# Patient Record
Sex: MALE | Race: WHITE | NOT HISPANIC OR LATINO | Employment: FULL TIME | URBAN - METROPOLITAN AREA
[De-identification: names, ages, dates, MRNs, and addresses within clinical notes are randomized per-mention and may not be internally consistent; named-entity substitution may affect disease eponyms.]

---

## 2017-03-08 ENCOUNTER — THERAPY VISIT (OUTPATIENT)
Dept: PHYSICAL THERAPY | Facility: CLINIC | Age: 47
End: 2017-03-08
Payer: COMMERCIAL

## 2017-03-08 DIAGNOSIS — M89.8X1 PAIN OF RIGHT SCAPULA: Primary | ICD-10-CM

## 2017-03-08 PROCEDURE — 97161 PT EVAL LOW COMPLEX 20 MIN: CPT | Mod: GP | Performed by: PHYSICAL THERAPIST

## 2017-03-08 PROCEDURE — 97140 MANUAL THERAPY 1/> REGIONS: CPT | Mod: GP | Performed by: PHYSICAL THERAPIST

## 2017-03-08 PROCEDURE — 97110 THERAPEUTIC EXERCISES: CPT | Mod: GP | Performed by: PHYSICAL THERAPIST

## 2017-03-08 NOTE — PROGRESS NOTES
[facility] Initial Evaluation    Subjective:    Jovon Loya is a 46 year old male with a right shoulder condition.  Condition occurred with:  Repetition/overuse.  Condition occurred: during recreation/sport.  This is a new condition  Patient reports an onset of right scapular pain on 1-17-17 after playing tennis.  He played tennis twice later in January with increased pain and last on 1-31-17.  He has used ice and Aleve for 2 weeks and rested and with some improvement. He has then used heat and had not had much improvement since then. Chief complaints are of intermittent right scapular pain. Pain is worse with driving with right arm elevated and lifting. He is not playing tennis. He denies weakness or stiffness. He denies neck pain or tingling or numbness. Patient is right handed. .    Patient reports pain:  Scapular area.     and is intermittent and reported as 2/10.  Associated symptoms:  Loss of strength. Pain is the same all the time.  Symptoms are exacerbated by lifting, using arm overhead and certain positions and relieved by ice and rest.  Since onset symptoms are gradually improving.  Special tests:  X-ray (shoulder and neck negative).      General health as reported by patient is good.  Pertinent medical history includes:  None.  Medical allergies: yes (morphine).  Other surgeries include:  Orthopedic surgery (lumbar 1997 and 1998).  Current medications:  None as reported by the patient.  Current occupation is Investigator.  Patient is working in normal job without restrictions.  Primary job tasks include:  Prolonged sitting and driving.                              Objective:    Standing Alignment:    Cervical/Thoracic:  Forward head  Shoulder/UE:  Rounded shoulders, protracted scapula L and protracted scapula R (large lipoma right T10 area)                                  Cervical/Thoracic Evaluation  Cervical AROM: normal (painfree except right SB mild right scap pain,  painfree with right closed paick positiion.)                                  Shoulder Evaluation:  ROM:  AROM:  normal                                  Strength:    Flexion: Left:5/5   Pain:    Right: 4+/5      Pain:  -  Extension:  Left: 4+/5    Pain:    Right: 4/5    Pain:  Abduction:  Left: 5/5  Pain:    Right: 5/5     Pain:    Internal Rotation:  Left:5/5     Pain:    Right: 5/5     Pain:  External Rotation:   Left:5/5     Pain:   Right:5/5     Pain:    Horizontal Abduction:  Left:4/5     Pain:    Right:4-/5    Pain:        Stability Testing:  normal      Special Tests:        Right shoulder negative for the following special tests:Impingement; Bursal and Rotator cuff tear  Palpation:      Right shoulder tenderness present at: Rhomboids (moderate hypertonicity and tenderness)                                     General     ROS    Assessment/Plan:      Patient is a 46 year old male with right side shoulder complaints.    Patient has the following significant findings with corresponding treatment plan.                Diagnosis 1:  Right scapular pain    Pain -  manual therapy, education and home program  Decreased ROM/flexibility - manual therapy and therapeutic exercise  Decreased strength - therapeutic exercise and therapeutic activities  Impaired muscle performance - neuro re-education  Decreased function - therapeutic activities  Impaired posture - neuro re-education    Therapy Evaluation Codes:   1) History comprised of:   Personal factors that impact the plan of care:      None.    Comorbidity factors that impact the plan of care are:      None.     Medications impacting care: None.  2) Examination of Body Systems comprised of:   Body structures and functions that impact the plan of care:      Cervical spine and Shoulder.   Activity limitations that impact the plan of care are:      Driving, Lifting and Sports.  3) Clinical presentation characteristics are:   Stable/Uncomplicated.  4) Decision-Making    Low  complexity using standardized patient assessment instrument and/or measureable assessment of functional outcome.  Cumulative Therapy Evaluation is: Low complexity.    Previous and current functional limitations:  (See Goal Flow Sheet for this information)    Short term and Long term goals: (See Goal Flow Sheet for this information)     Communication ability:  Patient appears to be able to clearly communicate and understand verbal and written communication and follow directions correctly.  Treatment Explanation - The following has been discussed with the patient:   RX ordered/plan of care  Anticipated outcomes  Possible risks and side effects  This patient would benefit from PT intervention to resume normal activities.   Rehab potential is good.    Frequency:  1 X week, once daily  Duration:  for 8 weeks  Discharge Plan:  Achieve all LTG.  Independent in home treatment program.  Reach maximal therapeutic benefit.    Please refer to the daily flowsheet for treatment today, total treatment time and time spent performing 1:1 timed codes.

## 2017-03-08 NOTE — LETTER
[facility] PHYSICAL THERAPY  [address]  [facility] [address]  [phone]    2017    Re: Jovon Loya   :   1970  MRN:  [id number]   REFERRING PHYSICIAN:   Arpit Boateng    [facility] PHYSICAL THERAPY  Date of Initial Evaluation:  3/8/17  Visits:  Rxs Used: 1  Reason for Referral:  Pain of right scapula    EVALUATION SUMMARY    [facility] Initial Evaluation    Subjective:    Jovon Loya is a 46 year old male with a right shoulder condition.  Condition occurred with:  Repetition/overuse.  Condition occurred: during recreation/sport.  This is a new condition  Patient reports an onset of right scapular pain on 17 after playing tennis.  He played tennis twice later in January with increased pain and last on 17.  He has used ice and Aleve for 2 weeks and rested and with some improvement. He has then used heat and had not had much improvement since then. Chief complaints are of intermittent right scapular pain. Pain is worse with driving with right arm elevated and lifting. He is not playing tennis. He denies weakness or stiffness. He denies neck pain or tingling or numbness. Patient is right handed. .    Patient reports pain:  Scapular area.     and is intermittent and reported as 2/10.  Associated symptoms:  Loss of strength. Pain is the same all the time.  Symptoms are exacerbated by lifting, using arm overhead and certain positions and relieved by ice and rest.  Since onset symptoms are gradually improving.  Special tests:  X-ray (shoulder and neck negative).      General health as reported by patient is good.  Pertinent medical history includes:  None.  Medical allergies: yes (morphine).  Other surgeries include:  Orthopedic surgery (lumbar  and ).  Current medications:  None as reported by the patient.  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  Primary job tasks include:  Prolonged sitting and driving.               Objective:    Standing Alignment:    Cervical/Thoracic:  Forward head  Shoulder/UE:  Rounded shoulders, protracted scapula L and protracted scapula R (large lipoma right T10 area)       Cervical/Thoracic Evaluation  Cervical AROM: normal (painfree except right SB mild right scap pain, painfree with right closed paick positiion.)           Re: Jovon Loya   :   1970          Shoulder Evaluation:  ROM:  AROM:  normal  Strength:    Flexion: Left:5/5   Pain:    Right: 4+/5      Pain:  -  Extension:  Left: 4+/5    Pain:    Right: 4/5    Pain:  Abduction:  Left: 5/5  Pain:    Right: 5/5     Pain:  Internal Rotation:  Left:5/5     Pain:    Right: 5/5     Pain:  External Rotation:   Left:5/5     Pain:   Right:5/5     Pain:    Horizontal Abduction:  Left:4/5     Pain:    Right:4-/5    Pain:  Stability Testing:  normal  Special Tests:    Right shoulder negative for the following special tests:Impingement; Bursal and Rotator cuff tear  Palpation:    Right shoulder tenderness present at: Rhomboids (moderate hypertonicity and tenderness)    Assessment/Plan:      Patient is a 46 year old male with right side shoulder complaints.    Patient has the following significant findings with corresponding treatment plan.                Diagnosis 1:  Right scapular pain    Pain -  manual therapy, education and home program  Decreased ROM/flexibility - manual therapy and therapeutic exercise  Decreased strength - therapeutic exercise and therapeutic activities  Impaired muscle performance - neuro re-education  Decreased function - therapeutic activities  Impaired posture - neuro re-education    Therapy Evaluation Codes:   1) History comprised of:   Personal factors that impact the plan of care:      None.    Comorbidity factors that impact the plan of care are:      None.     Medications impacting care:  None.  2) Examination of Body Systems comprised of:   Body structures and functions that impact the plan of care:      Cervical spine and Shoulder.   Activity limitations that impact the plan of care are:      Driving, Lifting and Sports.  3) Clinical presentation characteristics are:   Stable/Uncomplicated.  4) Decision-Making    Low complexity using standardized patient assessment instrument and/or measureable assessment of functional outcome.  Cumulative Therapy Evaluation is: Low complexity.    Previous and current functional limitations:  (See Goal Flow Sheet for this information)    Short term and Long term goals: (See Goal Flow Sheet for this information)   Re: Jovon Loya   :   1970          Communication ability:  Patient appears to be able to clearly communicate and understand verbal and written communication and follow directions correctly.  Treatment Explanation - The following has been discussed with the patient:   RX ordered/plan of care  Anticipated outcomes  Possible risks and side effects  This patient would benefit from PT intervention to resume normal activities.   Rehab potential is good.    Frequency:  1 X week, once daily  Duration:  for 8 weeks  Discharge Plan:  Achieve all LTG.  Independent in home treatment program.  Reach maximal therapeutic benefit.    Thank you for your referral.    INQUIRIES  Therapist: Delma Whittington, PT  [facility]  [address] [facility] [address]  [facility] [address]  Phone: [phone]  Fax: [phone]

## 2017-03-08 NOTE — MR AVS SNAPSHOT
After Visit Summary   3/8/2017    Jovon Loya    MRN: [id number]           Patient Information     Date Of Birth          1970        Visit Information        Provider Department      3/8/2017 10:10 AM Delma Whittington PT [facility] Physical Therapy        Today's Diagnoses     Pain of right scapula    -  1       Follow-ups after your visit        Your next 10 appointments already scheduled     Mar 16, 2017  8:50 AM CDT   ABY Extremity with Delma Whittington PT   [facility] Physical Therapy ([facility])    [address]  [facility] [address]   [phone]            Mar 23, 2017 10:10 AM CDT   ABY Extremity with Delma Whittington PT   [facility] Physical Therapy ([facility])    [address]  [facility] [address]   [phone]            Mar 30, 2017 10:10 AM CDT   ABY Extremity with Delma Whittington PT   [facility] Physical Therapy ([facility])    [address]  [facility] [address]   [phone]            Apr 05, 2017 10:10 AM CDT   ABY Extremity with Delma Whittington PT   [facility] Physical Therapy ([facility])    [address]  [facility] [address]   [phone]            Apr 13, 2017 10:10 AM CDT   ABY Extremity with Delma Whittington PT   [facility] Therapy ([facility])    [address]  [place] [address]   [phone]            Apr 20, 2017 10:10 AM CDT   ABY Extremity with Delma Whittington PT   [facility] Therapy ([facility])    [address]  [place] [address]   [phone]            Apr 27, 2017 10:10 AM CDT   ABY Extremity with Delma Whittington PT   [facility]  Physical Therapy ([facility])    [address] [address]  [facility] [address] [phone]            May 04, 2017 10:10 AM CDT   [facility] Extremity with Delma Whittington PT   [facility] Physical Therapy ([facility])    [address]  [facility] [id number] [phone]              Who to contact     If you have questions or need follow up information about today's clinic visit or your schedule please contact [facility] A[facility]LETIC [facility] PHYSICAL THERAPY directly at [phone].  Normal or non-critical lab and imaging results will be communicated to you by Liquid Gridshart, letter or phone within 4 business days after the clinic has received the results. If you do not hear from us within 7 days, please contact the clinic through Liquid Gridshart or phone. If you have a critical or abnormal lab result, we will notify you by phone as soon as possible.  Submit refill requests through Liquid Spins or call your pharmacy and they will forward the refill request to us. Please allow 3 business days for your refill to be completed.          Additional Information About Your Visit        Liquid Gridshart Information     Liquid Spins gives you secure access to your electronic health record. If you see a primary care provider, you can also send messages to your care team and make appointments. If you have questions, please call your primary care clinic.  If you do not have a primary care provider, please call [phone] and they will assist you.        Care EveryWhere ID     This is your Care EveryWhere ID. This could be used by other organizations to access your [place] medical records  [id number]         Blood Pressure from Last 3 Encounters:   09/13/16 122/88   06/27/13 150/90   10/13/11 120/70    Weight from Last 3 Encounters:   09/13/16 118.6 kg (261 lb 6.4 oz)   06/27/13 123.5 kg (272 lb 3.2 oz)   10/13/11 121.7 kg (268 lb 3.2 oz)              We Performed the Following      HC PT EVAL, LOW COMPLEXITY     ABY INITIAL EVAL REPORT     MANUAL THER TECH,1+REGIONS,EA 15 MIN     THERAPEUTIC EXERCISES        Primary Care Provider Office Phone # Fax #    Isak Granados [phone]       [facility] PHYSICIANS [address] [facility] [address]  [facility] [address]        Thank you!     Thank you for choosing [facility] [facility] PHYSICAL THERAPY  for your care. Our goal is always to provide you with excellent care. Hearing back from our patients is one way we can continue to improve our services. Please take a few minutes to complete the written survey that you may receive in the mail after your visit with us. Thank you!             Your Updated Medication List - Protect others around you: Learn how to safely use, store and throw away your medicines at www.disposemymeds.org.          This list is accurate as of: 3/8/17 11:14 AM.  Always use your most recent med list.                   Brand Name Dispense Instructions for use    ALEVE PO      Take 220 mg by mouth

## 2017-03-16 ENCOUNTER — THERAPY VISIT (OUTPATIENT)
Dept: PHYSICAL THERAPY | Facility: CLINIC | Age: 47
End: 2017-03-16
Payer: COMMERCIAL

## 2017-03-16 DIAGNOSIS — M89.8X1 PAIN OF RIGHT SCAPULA: ICD-10-CM

## 2017-03-16 PROCEDURE — 97110 THERAPEUTIC EXERCISES: CPT | Mod: GP | Performed by: PHYSICAL THERAPIST

## 2017-03-16 PROCEDURE — 97140 MANUAL THERAPY 1/> REGIONS: CPT | Mod: GP | Performed by: PHYSICAL THERAPIST

## 2017-03-16 NOTE — MR AVS SNAPSHOT
After Visit Summary   3/16/2017    Jovon Loya    MRN: [id number]           Patient Information     Date Of Birth          1970        Visit Information        Provider Department      3/16/2017 8:50 AM Delma Whittington PT [facility] Physical Therapy        Today's Diagnoses     Pain of right scapula           Follow-ups after your visit        Your next 10 appointments already scheduled     Mar 23, 2017 10:10 AM CDT   ABY Extremity with Delma Whittington PT   [facility] Physical Therapy ([facility])    [address]  [facility] [address]   [phone]            Mar 30, 2017 10:10 AM CDT   ABY Extremity with Delma Whittington PT   [facility] Physical Therapy ([facility])    [address]  [facility] [address]   [phone]            Apr 05, 2017 10:10 AM CDT   ABY Extremity with Delma Whittington PT   [place] [facility] Physical Therapy ([facility])    [address]  [facility] [address]   [phone]            Apr 13, 2017 10:10 AM CDT   ABY Extremity with Delma Whittington PT   [place] [facility] Physical Therapy ([facility])    [address]  [facility] [address]   [phone]            Apr 20, 2017 10:10 AM CDT   ABY Extremity with Delma Whittington PT   [place] [facility] Physical Therapy ([facility])    [address]  [place] [address]   [phone]            Apr 27, 2017 10:10 AM CDT   ABY Extremity with Delma Whittington PT   [place] [facility] Physical Therapy ([facility])    [address]  [facility] [address]   [phone]            May 04, 2017 10:10 AM CDT   ABY Extremity with Delma Whittington PT   [place] [facility]  Therapy ([facility]    [address]  [facility] [id number] [phone]              Who to contact     If you have questions or need follow up information about today's clinic visit or your schedule please contact [facility] directly at [phone].  Normal or non-critical lab and imaging results will be communicated to you by MyChart, letter or phone within 4 business days after the clinic has received the results. If you do not hear from us within 7 days, please contact the clinic through MyChart or phone. If you have a critical or abnormal lab result, we will notify you by phone as soon as possible.  Submit refill requests through Grability or call your pharmacy and they will forward the refill request to us. Please allow 3 business days for your refill to be completed.          Additional Information About Your Visit        Solais Lightinghart Information     Grability gives you secure access to your electronic health record. If you see a primary care provider, you can also send messages to your care team and make appointments. If you have questions, please call your primary care clinic.  If you do not have a primary care provider, please call [phone] and they will assist you.        Care EveryWhere ID     This is your Care EveryWhere ID. This could be used by other organizations to access your [place] medical records  [id number]         Blood Pressure from Last 3 Encounters:   09/13/16 122/88   06/27/13 150/90   10/13/11 120/70    Weight from Last 3 Encounters:   09/13/16 118.6 kg (261 lb 6.4 oz)   06/27/13 123.5 kg (272 lb 3.2 oz)   10/13/11 121.7 kg (268 lb 3.2 oz)              We Performed the Following     MANUAL THER TECH,1+REGIONS,EA 15 MIN     THERAPEUTIC EXERCISES        Primary Care Provider Office Phone # Fax #    Isak Granados [phone]       [facility] PHYSICIANS [address]  [facility] [address]        Thank you!     Thank you [facility] choosing [facility]  for your care. Our goal is always to provide you with excellent care. Hearing back from our patients is one way we can continue to improve our services. Please take a few minutes to complete the written survey that you may receive in the mail after your visit with us. Thank you!             Your Updated Medication List - Protect others around you: Learn how to safely use, store and throw away your medicines at www.disposemymeds.org.          This list is accurate as of: 3/16/17  9:25 AM.  Always use your most recent med list.                   Brand Name Dispense Instructions for use    ALEVE PO      Take 220 mg by mouth

## 2017-03-16 NOTE — PROGRESS NOTES
Subjective:    HPI                    Objective:    System    Physical Exam    General     ROS    Assessment/Plan:      SUBJECTIVE  Subjective changes as noted by pt:   R shoulder blade is feeling better and less of a muscle knot.    Current pain level:  0/10   Changes in function:  None     Adverse reaction to treatment or activity:  None    OBJECTIVE  Changes in objective findings:  Yes,  Trace right rhomboid hypertonicity. Posture poor to fair with effort. Poor to fair scap control. Prone shoulder extension grade 4+/5.      ASSESSMENT  Jovon continues to require intervention to meet STG and LTG's: PT  Patient is progressing as expected.  Response to therapy has shown an improvement in  pain level, flexibility, strength and muscle control  Progress made towards STG/LTG?  None    PLAN  Current treatment program is being advanced to more complex exercises.    PTA/ATC plan:  N/A    Please refer to the daily flowsheet for treatment today, total treatment time and time spent performing 1:1 timed codes.

## 2017-03-23 ENCOUNTER — THERAPY VISIT (OUTPATIENT)
Dept: PHYSICAL THERAPY | Facility: CLINIC | Age: 47
End: 2017-03-23
Payer: COMMERCIAL

## 2017-03-23 DIAGNOSIS — M89.8X1 PAIN OF RIGHT SCAPULA: ICD-10-CM

## 2017-03-23 PROCEDURE — 97110 THERAPEUTIC EXERCISES: CPT | Mod: GP | Performed by: PHYSICAL THERAPIST

## 2017-03-23 PROCEDURE — 97112 NEUROMUSCULAR REEDUCATION: CPT | Mod: GP | Performed by: PHYSICAL THERAPIST

## 2017-03-23 NOTE — MR AVS SNAPSHOT
After Visit Summary   3/23/2017    Jovon Loya    MRN: [id number]           Patient Information     Date Of Birth          1970        Visit Information        Provider Department      3/23/2017 10:10 AM Delma Whittington PT [facility]        Today's Diagnoses     Pain of right scapula           Follow-ups after your visit        Your next 10 appointments already scheduled     Mar 30, 2017 10:10 AM CDT   ABY Extremity with Delma Whittington PT   [facility] ([facility])    [address]  [facility] [address]   [phone]            Apr 05, 2017 10:10 AM CDT   ABY Extremity with Delma Whittington PT   [facility] ([facility])    [address]  [facility] [address]   [phone]            Apr 13, 2017 10:10 AM CDT   ABY Extremity with Delma Whittington PT   [facility] ([facility])    [address]  [facility] [address]   [phone]            Apr 20, 2017 10:10 AM CDT   ABY Extremity with Delma Whittington PT   [facility] ([facility])    [address]  [facility] [address]   [phone]            Apr 27, 2017 10:10 AM CDT   ABY Extremity with Delma Whittington PT   [facility] ([facility])    [address]  [facility] [address]   [phone]            May 04, 2017 10:10 AM CDT   ABY Extremity with Delma Whittington PT   [facility] ([facility])    [address]  [facility] [address]   [phone]              Who to contact     If you have questions or need follow up information about today's clinic visit or your schedule  please contact [place] [facility] PH[facility]ICAL THERAPY directly at [phone].  Normal or non-critical lab and imaging results will be communicated to you by MyChart, letter or phone within 4 business days after the clinic has received the results. If you do not hear from us within 7 days, please contact the clinic through [employer] or phone. If you have a critical or abnormal lab result, we will notify you by phone as soon as possible.  Submit refill requests through [employer] or call your pharmacy and they will forward the refill request to us. Please allow 3 business days for your refill to be completed.          Additional Information About Your Visit        [employer] Information     [employer] gives you secure access to your electronic health record. If you see a primary care provider, you can also send messages to your care team and make appointments. If you have questions, please call your primary care clinic.  If you do not have a primary care provider, please call [phone] and they will assist you.        Care EveryWhere ID     This is your Care EveryWhere ID. This could be used by other organizations to access your [place] medical records  [id number]         Blood Pressure from Last 3 Encounters:   09/13/16 122/88   06/27/13 150/90   10/13/11 120/70    Weight from Last 3 Encounters:   09/13/16 118.6 kg (261 lb 6.4 oz)   06/27/13 123.5 kg (272 lb 3.2 oz)   10/13/11 121.7 kg (268 lb 3.2 oz)              We Performed the Following     NEUROMUSCULAR RE-EDUCATION     THERAPEUTIC EXERCISES        Primary Care Provider Office Phone # Fax #    Isak Granados [phone]       [facility] [address] [facility] [address]  [facility] [address]        Thank you!     Thank you for choosing [facility] PHYSICAL THERAPY  for your care. Our goal is always to provide you with excellent care. Hearing back from our patients is  one way we can continue to improve our services. Please take a few minutes to complete the written survey that you may receive in the mail after your visit with us. Thank you!             Your Updated Medication List - Protect others around you: Learn how to safely use, store and throw away your medicines at www.disposemymeds.org.          This list is accurate as of: 3/23/17 10:51 AM.  Always use your most recent med list.                   Brand Name Dispense Instructions for use    ALEVE PO      Take 220 mg by mouth

## 2017-03-23 NOTE — PROGRESS NOTES
Subjective:    HPI                    Objective:    System    Physical Exam    General     ROS    Assessment/Plan:      SUBJECTIVE  Subjective changes as noted by pt:   Painfree. Avoiding heavy lifting and no tennis.   Current pain level: : 0/10   Changes in function:  Yes (See Goal flowsheet attached for changes in current functional level)     Adverse reaction to treatment or activity:  None    OBJECTIVE  Changes in objective findings:  Yes,  Normal right rhomboid tone. Prone shoulder extension grade 4+/5 R, 5/5 L. Good posture with effort. Fair to good scap control with prone arm raise, poor with rowing.      ASSESSMENT  Jovon continues to require intervention to meet STG and LTG's: PT  Patient's symptoms are resolving.  Patient is ready to progress to more complex exercises.  Response to therapy has shown an improvement in  pain level, flexibility, strength, muscle control, posture and function  Progress made towards STG/LTG?  Yes (See Goal flowsheet attached for updates on achievement of STG and LTG)    PLAN  Current treatment program is being advanced to more complex exercises.    PTA/ATC plan:  N/A    Please refer to the daily flowsheet for treatment today, total treatment time and time spent performing 1:1 timed codes.

## 2017-03-30 ENCOUNTER — THERAPY VISIT (OUTPATIENT)
Dept: PHYSICAL THERAPY | Facility: CLINIC | Age: 47
End: 2017-03-30
Payer: COMMERCIAL

## 2017-03-30 DIAGNOSIS — M89.8X1 PAIN OF RIGHT SCAPULA: ICD-10-CM

## 2017-03-30 PROCEDURE — 97112 NEUROMUSCULAR REEDUCATION: CPT | Mod: GP | Performed by: PHYSICAL THERAPIST

## 2017-03-30 PROCEDURE — 97110 THERAPEUTIC EXERCISES: CPT | Mod: GP | Performed by: PHYSICAL THERAPIST

## 2017-03-30 NOTE — PROGRESS NOTES
Subjective:    HPI                    Objective:    System    Physical Exam    General     ROS    Assessment/Plan:      SUBJECTIVE  Subjective changes as noted by pt:   Had a couple of stressful days at work with some right scap pain last night. Did not do exercises   Current pain level: 2/10     Changes in function:  Yes (See Goal flowsheet attached for changes in current functional level)     Adverse reaction to treatment or activity:  None    OBJECTIVE  Changes in objective findings:  Yes,  Right rhomboid had minimal to moderate hypertonicity. Bilateral shoulder extension grade 5/5. Good scap control with prone arm raise, fair with rowing, poor with pulldowns. Good posture with effort.    ASSESSMENT  Jovon continues to require intervention to meet STG and LTG's: PT  Patient's symptoms are resolving.  Patient is ready to progress to more complex exercises.  Response to therapy has shown an improvement in  pain level, strength, muscle control, posture and function  Progress made towards STG/LTG?  Yes (See Goal flowsheet attached for updates on achievement of STG and LTG)    PLAN  Current treatment program is being advanced to more complex exercises.    PTA/ATC plan:  N/A    Please refer to the daily flowsheet for treatment today, total treatment time and time spent performing 1:1 timed codes.

## 2017-03-30 NOTE — MR AVS SNAPSHOT
After Visit Summary   3/30/2017    Jovon Loya    MRN: [id number]           Patient Information     Date Of Birth          1970        Visit Information        Provider Department      3/30/2017 10:10 AM Delma Whittington PT [facility]        Today's Diagnoses     Pain of right scapula           Follow-ups after your visit        Your next 10 appointments already scheduled     Apr 05, 2017 10:10 AM CDT   ABY Extremity with Delma Whittington PT   [facility] ([facility])    [address]  [facility] [address]   [phone]            Apr 13, 2017 10:10 AM CDT   ABY Extremity with Delma Whittington PT   [facility] ([facility])    [address]  [facility] [address] [phone]            Apr 20, 2017 10:10 AM CDT   ABY Extremity with Delma Whittington PT   [facility] ([facility])    [address]  [facility] [address]   [phone]            Apr 27, 2017 10:10 AM CDT   ABY Extremity with Delma Whittington PT   [facility] ([facility])    [address]  [facility] [address] [phone]            May 04, 2017 10:10 AM CDT   ABY Extremity with Delma Whittington PT   [facility] ([facility])    [address]  [place] [address]   [phone]              Who to contact     If you have questions or need follow up information about today's clinic visit or your schedule please contact [place] [facility] PHYSICAL THERAPY directly at [phone].  Normal or non-critical lab and imaging results will be communicated to you by MyChart, letter or phone within 4 business days  after the clinic has received the results. If you do not hear from us within 7 days, please contact the clinic through [employer] or phone. If you have a critical or abnormal lab result, we will notify you by phone as soon as possible.  Submit refill requests through [employer] or call your pharmacy and they will forward the refill request to us. Please allow 3 business days for your refill to be completed.          Additional Information About Your Visit        [employer] Information     [employer] gives you secure access to your electronic health record. If you see a primary care provider, you can also send messages to your care team and make appointments. If you have questions, please call your primary care clinic.  If you do not have a primary care provider, please call [phone] and they will assist you.        Care EveryWhere ID     This is your Care EveryWhere ID. This could be used by other organizations to access your [place] medical records  [id number]         Blood Pressure from Last 3 Encounters:   09/13/16 122/88   06/27/13 150/90   10/13/11 120/70    Weight from Last 3 Encounters:   09/13/16 118.6 kg (261 lb 6.4 oz)   06/27/13 123.5 kg (272 lb 3.2 oz)   10/13/11 121.7 kg (268 lb 3.2 oz)              We Performed the Following     NEUROMUSCULAR RE-EDUCATION     THERAPEUTIC EXERCISES        Primary Care Provider Office Phone # Fax #    Isak Granados [phone]       [facility] [address] [facility] [address]  [facility] [address]        Thank you!     Thank you for choosing [facility] PHYSICAL THER[facility]  for your care. Our goal is always to provide you with excellent care. Hearing back from our patients is one way we can continue to improve our services. Please take a few minutes to complete the written survey that you may receive in the mail after your visit with us. Thank you!             Your Updated Medication List - Protect others  around you: Learn how to safely use, store and throw away your medicines at www.disposemymeds.org.          This list is accurate as of: 3/30/17 10:59 AM.  Always use your most recent med list.                   Brand Name Dispense Instructions for use    ALEVE PO      Take 220 mg by mouth

## 2017-04-05 ENCOUNTER — THERAPY VISIT (OUTPATIENT)
Dept: PHYSICAL THERAPY | Facility: CLINIC | Age: 47
End: 2017-04-05
Payer: COMMERCIAL

## 2017-04-05 DIAGNOSIS — M89.8X1 PAIN OF RIGHT SCAPULA: ICD-10-CM

## 2017-04-05 PROCEDURE — 97112 NEUROMUSCULAR REEDUCATION: CPT | Mod: GP | Performed by: PHYSICAL THERAPIST

## 2017-04-05 PROCEDURE — 97110 THERAPEUTIC EXERCISES: CPT | Mod: GP | Performed by: PHYSICAL THERAPIST

## 2017-04-05 NOTE — MR AVS SNAPSHOT
After Visit Summary   4/5/2017    Jovon Loya    MRN: [id number]           Patient Information     Date Of Birth          1970        Visit Information        Provider Department      4/5/2017 10:10 AM Delma Whittington, PAULINO [facility]        Today's Diagnoses     Pain of right scapula           Follow-ups after your visit        Your next 10 appointments already scheduled     Apr 13, 2017 10:10 AM CDT   ABY Extremity with Delma Whittington PT   [facility] ([facility])    [address]  [facility] [address]   [phone]            Apr 20, 2017 10:10 AM CDT   ABY Extremity with Delma Whittington PT   [facility] ([facility])    [address]  [facility] [address]   [phone]            Apr 27, 2017 10:10 AM CDT   ABY Extremity with Delma Whittington PT   [facility] ([facility])    [address]  [facility] [address]   [phone]            May 04, 2017 10:10 AM CDT   ABY Extremity with Delma Whittington PT   [facility]    [address]  [facility] [address] [phone]              Who to contact     If you have questions or need follow up information about today's clinic visit or your schedule please contact [facility] PHYSICAL THERAPY directly at [phone].  Normal or non-critical lab and imaging results will be communicated to you by MyChart, letter or phone within 4 business days after the clinic has received the results. If you do not hear from us within 7 days, please contact the clinic through MyChart or phone. If you have a critical or abnormal lab result, we will notify you by phone as soon as  possible.  Submit refill requests through [email or web address] or call your pharmacy and they will forward the refill request to us. Please allow 3 business days for your refill to be completed.          Additional Information About Your Visit        [employer] Information     [email or web address] gives you secure access to your electronic health record. If you see a primary care provider, you can also send messages to your care team and make appointments. If you have questions, please call your primary care clinic.  If you do not have a primary care provider, please call [phone] and they will assist you.        Care EveryWhere ID     This is your Care EveryWhere ID. This could be used by other organizations to access your [place] medical records  [id number]         Blood Pressure from Last 3 Encounters:   09/13/16 122/88   06/27/13 150/90   10/13/11 120/70    Weight from Last 3 Encounters:   09/13/16 118.6 kg (261 lb 6.4 oz)   06/27/13 123.5 kg (272 lb 3.2 oz)   10/13/11 121.7 kg (268 lb 3.2 oz)              We Performed the Following     NEUROMUSCULAR RE-EDUCATION     THERAPEUTIC EXERCISES        Primary Care Provider Office Phone # Fax #    Isak Granados [phone]       [facility] [address] [facility] [address]  [facility] [address]        Thank you!     Thank you for choosing [facility] PHYSICAL THERAP[facility]  for your care. Our goal is always to provide you with excellent care. Hearing back from our patients is one way we can continue to improve our services. Please take a few minutes to complete the written survey that you may receive in the mail after your visit with us. Thank you!             Your Updated Medication List - Protect others around you: Learn how to safely use, store and throw away your medicines at www.disposemymeds.org.          This list is accurate as of: 4/5/17 11:51 AM.  Always use your most recent med list.                   Brand Name  Dispense Instructions for use    ALEVE PO      Take 220 mg by mouth

## 2017-04-05 NOTE — PROGRESS NOTES
Subjective:    HPI                    Objective:    System    Physical Exam    General     ROS    Assessment/Plan:      SUBJECTIVE  Subjective changes as noted by pt:  Some soreness and aching in mid back, more central. Getting stronger.    Current pain level:  0/10   Changes in function:  Yes (See Goal flowsheet attached for changes in current functional level)     Adverse reaction to treatment or activity:  None    OBJECTIVE  Changes in objective findings:  Yes,  Normal right rhomboid tone. Good posture with effort. Prone shoulder extension and scaption grade 5/5 on right. Good scap control with rowing, fair with pulldowns and fair to poor with shoulder IR at 90/90 with tubing. Fair scap control with throwing 1 kg ball on rebounder     ASSESSMENT  Jovon continues to require intervention to meet STG and LTG's: PT  Patient's symptoms are resolving.  Patient is ready to progress to more complex exercises.  Response to therapy has shown an improvement in  pain level, flexibility, strength, muscle control, posture and function  Progress made towards STG/LTG?  Yes (See Goal flowsheet attached for updates on achievement of STG and LTG)    PLAN  Current treatment program is being advanced to more complex exercises.    PTA/ATC plan:  N/A    Please refer to the daily flowsheet for treatment today, total treatment time and time spent performing 1:1 timed codes.

## 2017-04-13 ENCOUNTER — THERAPY VISIT (OUTPATIENT)
Dept: PHYSICAL THERAPY | Facility: CLINIC | Age: 47
End: 2017-04-13
Payer: COMMERCIAL

## 2017-04-13 DIAGNOSIS — M89.8X1 PAIN OF RIGHT SCAPULA: ICD-10-CM

## 2017-04-13 PROCEDURE — 97112 NEUROMUSCULAR REEDUCATION: CPT | Mod: GP | Performed by: PHYSICAL THERAPIST

## 2017-04-13 PROCEDURE — 97110 THERAPEUTIC EXERCISES: CPT | Mod: GP | Performed by: PHYSICAL THERAPIST

## 2017-04-13 NOTE — PROGRESS NOTES
Subjective:    HPI                    Objective:    System    Physical Exam    General     ROS    Assessment/Plan:      SUBJECTIVE  Subjective changes as noted by pt:   Mild soreness in right scap on 4/11/17 after heavy workout, otherwise painfree   Current pain level: 0/10   Changes in function:  Yes (See Goal flowsheet attached for changes in current functional level)     Adverse reaction to treatment or activity:  None    OBJECTIVE  Changes in objective findings:  Yes, Normal right rhomboid tone. Good posture with effort. Prone shoulder extension, rhomboid and scaption grade 5/5 on right. Bilateral lower trap grade 4/5. Good scap control with rowing and pulldowns and fair to good with shoulder IR at 90/90 with tubing. Fair to good scap control with throwing 2 kg ball on rebounder            ASSESSMENT  Jovon continues to require intervention to meet STG and LTG's: PT  Patient's symptoms are resolving.  Patient is ready to progress to more complex exercises.  Response to therapy has shown an improvement in  pain level, flexibility, strength, muscle control, posture and function  Progress made towards STG/LTG?  Yes (See Goal flowsheet attached for updates on achievement of STG and LTG)    PLAN  Current treatment program is being advanced to more complex exercises.    PTA/ATC plan:  N/A    Please refer to the daily flowsheet for treatment today, total treatment time and time spent performing 1:1 timed codes.

## 2017-04-13 NOTE — MR AVS SNAPSHOT
After Visit Summary   4/13/2017    Jovon Loya    MRN: [id number]           Patient Information     Date Of Birth          1970        Visit Information        Provider Department      4/13/2017 10:10 AM Delma Whittington PT [facility] Physical Therapy        Today's Diagnoses     Pain of right scapula           Follow-ups after your visit        Your next 10 appointments already scheduled     Apr 19, 2017  4:00 PM CDT   ABY Extremity with Delma Whittington PT   [facility] Therapy ([facility])    [address]  [facility] [address] [phone]              Who to contact     If you have questions or need follow up information about today's clinic visit or your schedule please contact [facility] PHYSICAL THERAPY directly at [phone].  Normal or non-critical lab and imaging results will be communicated to you by MyChart, letter or phone within 4 business days after the clinic has received the results. If you do not hear from us within 7 days, please contact the clinic through [employer] or phone. If you have a critical or abnormal lab result, we will notify you by phone as soon as possible.  Submit refill requests through [employer] or call your pharmacy and they will forward the refill request to us. Please allow 3 business days for your refill to be completed.          Additional Information About Your Visit        MyChart Information     [employer] gives you secure access to your electronic health record. If you see a primary care provider, you can also send messages to your care team and make appointments. If you have questions, please call your primary care clinic.  If you do not have a primary care provider, please call [phone] and they will assist you.        Care EveryWhere ID     This is your Care EveryWhere ID. This could be used by other organizations  to access your [place] medical records  [id number]         Blood Pressure from Last 3 Encounters:   09/13/16 122/88   06/27/13 150/90   10/13/11 120/70    Weight from Last 3 Encounters:   09/13/16 118.6 kg (261 lb 6.4 oz)   06/27/13 123.5 kg (272 lb 3.2 oz)   10/13/11 121.7 kg (268 lb 3.2 oz)              We Performed the Following     NEUROMUSCULAR RE-EDUCATION     THERAPEUTIC EXERCISES        Primary Care Provider Office Phone # Fax #    Isak Granados [phone]       [facility] [address] [facility] [address]  [facility] [address]        Thank you!     Thank you for choosing [place] [facility] PHYSICAL THERAPY  for your care. Our goal is always to provide you with excellent care. Hearing back from our patients is one way we can continue to improve our services. Please take a few minutes to complete the written survey that you may receive in the mail after your visit with us. Thank you!             Your Updated Medication List - Protect others around you: Learn how to safely use, store and throw away your medicines at www.disposemymeds.org.          This list is accurate as of: 4/13/17 11:45 AM.  Always use your most recent med list.                   Brand Name Dispense Instructions for use    ALEVE PO      Take 220 mg by mouth

## 2017-04-19 ENCOUNTER — THERAPY VISIT (OUTPATIENT)
Dept: PHYSICAL THERAPY | Facility: CLINIC | Age: 47
End: 2017-04-19
Payer: COMMERCIAL

## 2017-04-19 DIAGNOSIS — M89.8X1 PAIN OF RIGHT SCAPULA: ICD-10-CM

## 2017-04-19 PROCEDURE — 97112 NEUROMUSCULAR REEDUCATION: CPT | Mod: GP | Performed by: PHYSICAL THERAPIST

## 2017-04-19 PROCEDURE — 97110 THERAPEUTIC EXERCISES: CPT | Mod: GP | Performed by: PHYSICAL THERAPIST

## 2017-04-19 NOTE — PROGRESS NOTES
Subjective:    HPI                    Objective:    System    Physical Exam    General     ROS    Assessment/Plan:      SUBJECTIVE  Subjective changes as noted by pt:   Played some tennis with son x 30 min and served 20-30 times without pain. Mild right scap pain with stress.    Current pain level: 1-2/10   g  Changes in function:  Yes (See Goal flowsheet attached for changes in current functional level)     Adverse reaction to treatment or activity:  None    OBJECTIVE  Changes in objective findings:  Yes, minimal right rhomboid tone. Good posture with effort. Prone shoulder extension, rhomboid and scaption grade 5/5 on right. Bilateral lower trap grade 4+/5. Good scap control with rowing and pulldowns and fair to good with shoulder IR at 90/90 with tubing. Good scap control with throwing 2 kg ball on rebounder            ASSESSMENT  Jovon continues to require intervention to meet STG and LTG's: PT  Patient's symptoms are resolving.  Response to therapy has shown an improvement in  pain level, flexibility, strength, muscle control, posture and function  Progress made towards STG/LTG?  Yes (See Goal flowsheet attached for updates on achievement of STG and LTG)    PLAN  Current treatment program is being advanced to more complex exercises.    PTA/ATC plan:  N/A    Please refer to the daily flowsheet for treatment today, total treatment time and time spent performing 1:1 timed codes.

## 2017-04-19 NOTE — MR AVS SNAPSHOT
After Visit Summary   4/19/2017    Jovon Loya    MRN: [id number]           Patient Information     Date Of Birth          1970        Visit Information        Provider Department      4/19/2017 4:00 PM Delma Whittington PT [facility]        Today's Diagnoses     Pain of right scapula           Follow-ups after your visit        Your next 10 appointments already scheduled     May 23, 2017  9:30 AM CDT   ABY Extremity with Delma Whittington PT   [facility] ([facility])    [address]  [facility] [id number] [phone]              Who to contact     If you have questions or need follow up information about today's clinic visit or your schedule please contact [facility]ER[facility] directly at [phone].  Normal or non-critical lab and imaging results will be communicated to you by MyChart, letter or phone within 4 business days after the clinic has received the results. If you do not hear from us within 7 days, please contact the clinic through Next Safetyhart or phone. If you have a critical or abnormal lab result, we will notify you by phone as soon as possible.  Submit refill requests through MDJunction or call your pharmacy and they will forward the refill request to us. Please allow 3 business days for your refill to be completed.          Additional Information About Your Visit        MyChart Information     MDJunction gives you secure access to your electronic health record. If you see a primary care provider, you can also send messages to your care team and make appointments. If you have questions, please call your primary care clinic.  If you do not have a primary care provider, please call [phone] and they will assist you.        Care EveryWhere ID     This is your Care EveryWhere ID. This could be used by other organizations  to access your [place] medical records  [id number]         Blood Pressure from Last 3 Encounters:   09/13/16 122/88   06/27/13 150/90   10/13/11 120/70    Weight from Last 3 Encounters:   09/13/16 118.6 kg (261 lb 6.4 oz)   06/27/13 123.5 kg (272 lb 3.2 oz)   10/13/11 121.7 kg (268 lb 3.2 oz)              We Performed the Following     NEUROMUSCULAR RE-EDUCATION     THERAPEUTIC EXERCISES        Primary Care Provider Office Phone # Fax #    Isak Granados [phone]       [facility] [address] [facility] [address]  [facility] [address]        Thank you!     Thank you for choosing [place] [facility]  for your care. Our goal is always to provide you with excellent care. Hearing back from our patients is one way we can continue to improve our services. Please take a few minutes to complete the written survey that you may receive in the mail after your visit with us. Thank you!             Your Updated Medication List - Protect others around you: Learn how to safely use, store and throw away your medicines at www.disposemymeds.org.          This list is accurate as of: 4/19/17  5:49 PM.  Always use your most recent med list.                   Brand Name Dispense Instructions for use    ALEVE PO      Take 220 mg by mouth

## 2017-04-19 NOTE — LETTER
[facility] PH[facility]ICAL THERAP[facility]  [address]  [facility] [address]  [phone]    Nguyen 15, 2017    Re: Jovon Loya   :   1970  MRN:  [id number]   REFERRING PHYSICIAN:   Arpit Boateng    [facility]  Date of Initial Evaluation:  17  Visits:  Rxs Used: 7  Reason for Referral:  Pain of right scapula     DISCHARGE REPORT  Progress reporting period is from 3/8/17 to 17.     SUBJECTIVE  Subjective: Played some tennis with son x 30 min and served 20-30 times without pain. Mild right scap pain with stress.    Current Pain Level: 1-2/10  Initial Pain level: 2/10 (intermittent)   Patient has failed to return to therapy so current objective findings are unknown.  The subjective and objective information are from the last SOAP note on this patient.    OBJECTIVE    Minimal right rhomboid tone. Good posture with effort. Prone shoulder extension, rhomboid and scaption grade 5/5 on right. Bilateral lower trap grade 4+/5. Good scap control with rowing and pulldowns and fair to good with shoulder IR at 90/90 with tubing. Good scap control with throwing 2 kg ball on rebounder     ASSESSMENT/PLAN  Updated problem list and treatment plan: Diagnosis 1:  Right scapular pain    Pain- home program  Decreased strength - home program  Impaired muscle performance - home program  Decreased function - home program  STG/LTGs have been met or progress has been made towards goals:  Yes (See Goal flow sheet completed today.)  Assessment of Progress: The patient has not returned to therapy. Current status is unknown.  Self Management Plans:  Patient has been instructed in a home treatment program.    Jovon continues to require the following intervention to meet STG and LTG's: PT intervention is no longer required to meet STG/LTG.  The patient failed to complete scheduled/ordered appointments so current information is  unknown.  We will discharge this patient from PT.    Thank you for your referral.    INQUIRIES  Therapist: Delma Whittington, PT  [facility]  [id number] [place] [address]  Phone: [phone]  Fax: [phone]

## 2017-06-15 PROBLEM — M89.8X1 PAIN OF RIGHT SCAPULA: Status: RESOLVED | Noted: 2017-03-08 | Resolved: 2017-06-15

## 2017-06-15 NOTE — PROGRESS NOTES
Subjective:    HPI                    Objective:    System    Physical Exam    General     ROS    Assessment/Plan:      DISCHARGE REPORT    Progress reporting period is from 3/8/17 to 4/19/17.     SUBJECTIVE  Subjective: Played some tennis with son x 30 min and served 20-30 times without pain. Mild right scap pain with stress.        Current Pain Level: 1-2/10  Initial Pain level: 2/10 (intermittent)          Patient has failed to return to therapy so current objective findings are unknown.  The subjective and objective information are from the last SOAP note on this patient.    OBJECTIVE    Minimal right rhomboid tone. Good posture with effort. Prone shoulder extension, rhomboid and scaption grade 5/5 on right. Bilateral lower trap grade 4+/5. Good scap control with rowing and pulldowns and fair to good with shoulder IR at 90/90 with tubing. Good scap control with throwing 2 kg ball on rebounder               ASSESSMENT/PLAN  Updated problem list and treatment plan: Diagnosis 1:  Right scapular pain    Pain- home program  Decreased strength - home program  Impaired muscle performance - home program  Decreased function - home program  STG/LTGs have been met or progress has been made towards goals:  Yes (See Goal flow sheet completed today.)  Assessment of Progress: The patient has not returned to therapy. Current status is unknown.  Self Management Plans:  Patient has been instructed in a home treatment program.    Jovon continues to require the following intervention to meet STG and LTG's: PT intervention is no longer required to meet STG/LTG.  The patient failed to complete scheduled/ordered appointments so current information is unknown.  We will discharge this patient from PT.    Recommendations:      Please refer to the daily flowsheet for treatment today, total treatment time and time spent performing 1:1 timed codes.

## 2018-02-03 ENCOUNTER — OFFICE VISIT (OUTPATIENT)
Dept: FAMILY MEDICINE | Facility: CLINIC | Age: 48
End: 2018-02-03

## 2018-02-03 VITALS
BODY MASS INDEX: 29.13 KG/M2 | TEMPERATURE: 98.1 F | HEART RATE: 68 BPM | WEIGHT: 251.8 LBS | SYSTOLIC BLOOD PRESSURE: 126 MMHG | HEIGHT: 78 IN | DIASTOLIC BLOOD PRESSURE: 88 MMHG | RESPIRATION RATE: 20 BRPM

## 2018-02-03 DIAGNOSIS — J01.90 ACUTE SINUSITIS WITH SYMPTOMS > 10 DAYS: Primary | ICD-10-CM

## 2018-02-03 PROCEDURE — 99213 OFFICE O/P EST LOW 20 MIN: CPT | Performed by: FAMILY MEDICINE

## 2018-02-03 NOTE — MR AVS SNAPSHOT
"              After Visit Summary   2/3/2018    Jovon Loya    MRN: [id number]           Patient Information     Date Of Birth          1970        Visit Information        Provider Department      2/3/2018 9:00 AM Luda Grier MD [facility]        Today's Diagnoses     Acute sinusitis with symptoms > 10 days    -  1       Follow-ups after your visit        Who to contact     If you have questions or need follow up information about today's clinic visit or your schedule please contact [facility] directly at [phone].  Normal or non-critical lab and imaging results will be communicated to you by MyChart, letter or phone within 4 business days after the clinic has received the results. If you do not hear from us within 7 days, please contact the clinic through rollApphart or phone. If you have a critical or abnormal lab result, we will notify you by phone as soon as possible.  Submit refill requests through BIScience or call your pharmacy and they will forward the refill request to us. Please allow 3 business days for your refill to be completed.          Additional Information About Your Visit        MyChart Information     BIScience gives you secure access to your electronic health record. If you see a primary care provider, you can also send messages to your care team and make appointments. If you have questions, please call your primary care clinic.  If you do not have a primary care provider, please call [phone] and they will assist you.        Care EveryWhere ID     This is your Care EveryWhere ID. This could be used by other organizations to access your [place] medical records  [id number]        Your Vitals Were     Pulse Respirations Height BMI (Body Mass Index)          68 20 2.051 m (6' 8.75\") 27.15 kg/m2         Blood Pressure from Last 3 Encounters:   02/03/18 126/88   09/13/16 122/88   06/27/13 150/90    Weight from Last 3 Encounters: "   02/03/18 114.2 kg (251 lb 12.8 oz)   09/13/16 118.6 kg (261 lb 6.4 oz)   06/27/13 123.5 kg (272 lb 3.2 oz)              Today, you had the following     No orders found for display         Today's Medication Changes          These changes are accurate as of 2/3/18  9:19 AM.  If you have any questions, ask your nurse or doctor.               Start taking these medicines.        Dose/Directions    amoxicillin-clavulanate 875-125 MG per tablet   Commonly known as:  AUGMENTIN   Used for:  Acute sinusitis with symptoms > 10 days   Started by:  Luda Grier MD        Dose:  1 tablet   Take 1 tablet by mouth 2 times daily   Quantity:  20 tablet   Refills:  0            Where to get your medicines      These medications were sent to [facility] [address] [place] [address]    Hours:  24-hours Phone:  [phone]     amoxicillin-clavulanate 875-125 MG per tablet                Primary Care Provider Office Phone # Fax #    Isak Juan Granados [phone] [address] [address]        Equal Access to Services     LEIGH ANN CHANDLER AH: Hadii aad ku hadasho Soomaali, waaxda luqadaha, qaybta kaalmada adeegyada, waxay idiin hayaan deepa rogel. So [facility] [phone].    ATENCIÓN: Si habla español, tiene a badillo disposición servicios gratuitos de asistencia lingüística. ame al [phone].    We comply with applicable federal civil rights laws and [place] laws. We do not discriminate on the basis of race, color, national origin, age, disability, sex, sexual orientation, or gender identity.            Thank you!     Thank you for choosing [facility]  for your care. Our goal is always to provide you with excellent care. Hearing back from our patients is one way we can continue to improve our services. Please take a few minutes to complete the  written survey that you may receive in the mail after your visit with us. Thank you!             Your Updated Medication List - Protect others around you: Learn how to safely use, store and throw away your medicines at www.disposemymeds.org.          This list is accurate as of 2/3/18  9:19 AM.  Always use your most recent med list.                   Brand Name Dispense Instructions for use Diagnosis    amoxicillin-clavulanate 875-125 MG per tablet    AUGMENTIN    20 tablet    Take 1 tablet by mouth 2 times daily    Acute sinusitis with symptoms > 10 days

## 2018-02-03 NOTE — PROGRESS NOTES
"SUBJECTIVE:  3.5 weeks of URI symptoms  Initial fever and cold symptoms  Persistent cough- purulent nasal drainage  No history of sinus or nasal surgery    Taking mucinex- sinus    OBJECTIVE:  /88 (BP Location: Left arm, Patient Position: Chair, Cuff Size: Adult Regular)  Pulse 68  Resp 20  Ht 2.051 m (6' 8.75\")  Wt 114.2 kg (251 lb 12.8 oz)  BMI 27.15 kg/m2  External ears  and canals clear bilaterally. TM's normal bilaterally. Nose normal without lesions or discharge. Oropharynx normal. Neck supple without palpable adenopathy.  Regular rate and  rhythm. S1 and S2 normal, no murmurs, clicks, gallops or rubs. No edema or JVD. Chest is clear; no wheezes or rales.    Assessment   (J01.90) Acute sinusitis with symptoms > 10 days  (primary encounter diagnosis)  Comment: continue mucinex  Plan: amoxicillin-clavulanate (AUGMENTIN) 875-125 MG         per tablet            "

## 2018-02-03 NOTE — NURSING NOTE
Jovon Loya presents with an illness characterized by productive cough with sputum and headache. Symptoms began 3 weeks ago and are unchanged since that time.  Questioned patient about current smoking habits.  Pt. has never smoked.  Body mass index is 33.67 kg/(m^2).    PULSE regular  My Chart: active  Body mass index is 27.15 kg/(m^2).  Pre-visit planning  Immunizations - up to date  Colonoscopy -   Mammogram -   Asthma -   PHQ9 -    KARMA-7 -

## 2019-08-20 ENCOUNTER — OFFICE VISIT (OUTPATIENT)
Dept: FAMILY MEDICINE | Facility: CLINIC | Age: 49
End: 2019-08-20

## 2019-08-20 VITALS
RESPIRATION RATE: 20 BRPM | BODY MASS INDEX: 29.32 KG/M2 | SYSTOLIC BLOOD PRESSURE: 128 MMHG | HEIGHT: 78 IN | HEART RATE: 76 BPM | DIASTOLIC BLOOD PRESSURE: 84 MMHG | TEMPERATURE: 97.8 F | WEIGHT: 253.4 LBS

## 2019-08-20 DIAGNOSIS — M51.26 DISPLACEMENT OF LUMBAR INTERVERTEBRAL DISC WITHOUT MYELOPATHY: ICD-10-CM

## 2019-08-20 DIAGNOSIS — Z12.11 SPECIAL SCREENING FOR MALIGNANT NEOPLASMS, COLON: ICD-10-CM

## 2019-08-20 DIAGNOSIS — Z00.00 ROUTINE GENERAL MEDICAL EXAMINATION AT A HEALTH CARE FACILITY: Primary | ICD-10-CM

## 2019-08-20 LAB
ALBUMIN SERPL-MCNC: 4.8 G/DL (ref 3.6–5.1)
ALBUMIN/GLOB SERPL: 2.3 {RATIO} (ref 1–2.5)
ALP SERPL-CCNC: 58 U/L (ref 33–130)
ALT 1742-6: 11 U/L (ref 5–30)
AST 1920-8: 11 U/L (ref 7–31)
BILIRUB SERPL-MCNC: 0.9 MG/DL (ref 0.2–1.2)
BUN SERPL-MCNC: 12 MG/DL (ref 7–25)
BUN/CREATININE RATIO: 9.9 (ref 6–22)
CALCIUM SERPL-MCNC: 9.5 MG/DL (ref 8.6–10.3)
CHLORIDE SERPLBLD-SCNC: 108 MMOL/L (ref 98–110)
CHOLEST SERPL-MCNC: 208 MG/DL (ref 0–199)
CHOLEST/HDLC SERPL: 4 {RATIO} (ref 0–5)
CO2 SERPL-SCNC: 27 MMOL/L (ref 20–32)
CREAT SERPL-MCNC: 1.21 MG/DL (ref 0.7–1.18)
GLOBULIN, CALCULATED - QUEST: 2.1 (ref 1.9–3.7)
GLUCOSE SERPL-MCNC: 114 MG/DL (ref 60–99)
HDLC SERPL-MCNC: 53 MG/DL (ref 40–150)
LDLC SERPL CALC-MCNC: 130 MG/DL (ref 0–130)
POTASSIUM SERPL-SCNC: 4.57 MMOL/L (ref 3.5–5.3)
PROT SERPL-MCNC: 6.9 G/DL (ref 6.1–8.1)
SODIUM SERPL-SCNC: 143 MMOL/L (ref 135–146)
TRIGL SERPL-MCNC: 127 MG/DL (ref 0–149)

## 2019-08-20 PROCEDURE — 36415 COLL VENOUS BLD VENIPUNCTURE: CPT | Performed by: FAMILY MEDICINE

## 2019-08-20 PROCEDURE — 84153 ASSAY OF PSA TOTAL: CPT | Mod: 90 | Performed by: FAMILY MEDICINE

## 2019-08-20 PROCEDURE — 99396 PREV VISIT EST AGE 40-64: CPT | Performed by: FAMILY MEDICINE

## 2019-08-20 PROCEDURE — 80061 LIPID PANEL: CPT | Performed by: FAMILY MEDICINE

## 2019-08-20 PROCEDURE — 80053 COMPREHEN METABOLIC PANEL: CPT | Performed by: FAMILY MEDICINE

## 2019-08-20 ASSESSMENT — MIFFLIN-ST. JEOR: SCORE: 2190.29

## 2019-08-20 NOTE — NURSING NOTE
Jovon Loya is here for a CPX.    Pre-visit planning  Immunizations -up to date  Colonoscopy -is due and ordered today  Mammogram -  Asthma test --  PHQ9 -  KARMA 7 -    Questioned patient about current smoking habits.  Pt. has never smoked.  Body mass index is 27.15 kg/m .  PULSE regular  My Chart: active  CLASSIFICATION OF OVERWEIGHT AND OBESITY BY BMI                        Obesity Class           BMI(kg/m2)  Underweight                                    < 18.5  Normal                                         18.5-24.9  Overweight                                     25.0-29.9  OBESITY                     I                  30.0-34.9                             II                 35.0-39.9  EXTREME OBESITY             III                >40                            Patient's  BMI Body mass index is 27.15 kg/m .  Http://hin.nhlbi.nih.gov/menuplanner/menu.cgi

## 2019-08-20 NOTE — PROGRESS NOTES
SUBJECTIVE:   CC: Jovon Loya is an 49 year old male who presents for preventive health visit.     Healthy Habits:    Do you get at least three servings of calcium containing foods daily (dairy, green leafy vegetables, etc.)? yes    Amount of exercise or daily activities, outside of work: 3 day(s) per week    Problems taking medications regularly No    Medication side effects: No    Have you had an eye exam in the past two years? no    Do you see a dentist twice per year? yes    Do you have sleep apnea, excessive snoring or daytime drowsiness?no    Pt states back flaring a little-had some tingling down right leg last few weeks, did start exercising and feels a bit better      Today's PHQ-2 Score:   PHQ-2 ( 1999 Pfizer) 9/13/2016   Q1: Little interest or pleasure in doing things 0   Q2: Feeling down, depressed or hopeless 0   PHQ-2 Score 0       Abuse: Current or Past(Physical, Sexual or Emotional)- No  Do you feel safe in your environment? Yes    Social History     Tobacco Use     Smoking status: Never Smoker     Smokeless tobacco: Never Used   Substance Use Topics     Alcohol use: No     If you drink alcohol do you typically have >3 drinks per day or >7 drinks per week? No                      Last PSA:   [facility]ott PSA   Date Value Ref Range Status   09/13/2016 1.1 < OR = 4.0 ng/mL Final     Comment:        This test was performed using the Siemens  chemiluminescent method. Values obtained from  different assay methods cannot be used  interchangeably. PSA levels, regardless of  value, should not be interpreted as absolute  evidence of the presence or absence of disease.            Reviewed orders with patient. Reviewed health maintenance and updated orders accordingly - Yes  BP Readings from Last 3 Encounters:   08/20/19 128/84   02/03/18 126/88   09/13/16 122/88    Wt Readings from Last 3 Encounters:   08/20/19 114.9 kg (253 lb 6.4 oz)   02/03/18 114.2 kg (251 lb 12.8 oz)   09/13/16 118.6 kg (261 lb 6.4 oz)                   Patient Active Problem List   Diagnosis     Displacement of lumbar intervertebral disc without myelopathy     FAM HX-CARDIOVAS DIS NEC     Health Care Home     Family history of malignant neoplasm of prostate     Mixed hyperlipidemia     Abnormal glucose     ACP (advance care planning)     Past Surgical History:   Procedure Laterality Date     C GUERRA W/O FACETEC FORAMOT/DSKC 1/2 VRT SEG, LUMBAR  97     C GUERRA W/O FACETEC FORAMOT/DSKC  VRT SEG, LUMBAR  98       Social History     Tobacco Use     Smoking status: Never Smoker     Smokeless tobacco: Never Used   Substance Use Topics     Alcohol use: No     Family History   Problem Relation Age of Onset     Alcohol/Drug Mother      Heart Disease Father          44 MI     Lipids Maternal Grandmother      Prostate Cancer Maternal Grandfather      Cardiovascular Brother         MVR     Cancer No family hx of      Diabetes No family hx of      Cancer - colorectal No family hx of      Cerebrovascular Disease No family hx of          No current outpatient medications on file.     Allergies   Allergen Reactions     Morphine      NAUSEA     Recent Labs   Lab Test 16  1109 13  0921   * 134*   HDL 45 40   TRIG 107 151*   ALT 25 36   CR 1.12 1.18   GFRESTIMATED 78 75   POTASSIUM 4.3 4.0        Reviewed and updated as needed this visit by clinical staff  Tobacco  Allergies  Meds  Problems         Reviewed and updated as needed this visit by Provider        Past Medical History:   Diagnosis Date     Displacement of lumbar intervertebral disc without myelopathy      FAMILY HX-CARDIOVAS DIS NEC      FAMILY HX-CARDIOVAS DIS NEC     Father had MI at age 44     LUMBAR DISC DISPLACEMENT      Mixed hyperlipidemia 8/10/2001     Pityriasis versicolor 2004      Past Surgical History:   Procedure Laterality Date     C GUERRA W/O FACETEC FORAMOT/DSKC 1/2 VRT SEG, LUMBAR  97     C GUERRA W/O FACETEC FORAMOT/[facility]KC 2 VRT SEG, LUMBAR  98  "      ROS:  CONSTITUTIONAL: NEGATIVE for fever, chills, change in weight  INTEGUMENTARY/SKIN: NEGATIVE for worrisome rashes, moles or lesions  EYES: NEGATIVE for vision changes or irritation  ENT: NEGATIVE for ear, mouth and throat problems  RESP: NEGATIVE for significant cough or SOB  CV: NEGATIVE for chest pain, palpitations or peripheral edema  GI: NEGATIVE for nausea, abdominal pain, heartburn, or change in bowel habits   male: negative for dysuria, hematuria, decreased urinary stream, erectile dysfunction, urethral discharge  MUSCULOSKELETAL: NEGATIVE for significant arthralgias or myalgia  NEURO: NEGATIVE for weakness, dizziness or paresthesias  ENDOCRINE: NEGATIVE for temperature intolerance, skin/hair changes  HEME/ALLERGY/IMMUNE: NEGATIVE for bleeding problems  PSYCHIATRIC: NEGATIVE for changes in mood or affect    OBJECTIVE:   /84 (BP Location: Left arm, Patient Position: Chair, Cuff Size: Adult Regular)   Pulse 76   Temp 97.8  F (36.6  C) (Oral)   Resp 20   Ht 2.057 m (6' 9\")   Wt 114.9 kg (253 lb 6.4 oz)   BMI 27.15 kg/m    EXAM:  GENERAL: healthy, alert and no distress  EYES: Eyes grossly normal to inspection, PERRL and conjunctivae and sclerae normal  HENT: ear canals and TM's normal, nose and mouth without ulcers or lesions  NECK: no adenopathy, no asymmetry, masses, or scars and thyroid normal to palpation  RESP: lungs clear to auscultation - no rales, rhonchi or wheezes  CV: regular rate and rhythm, normal S1 S2, no S3 or S4, no murmur, click or rub, no peripheral edema and peripheral pulses strong  ABDOMEN: soft, nontender, no hepatosplenomegaly, no masses and bowel sounds normal   (male): normal male genitalia without lesions or urethral discharge, no hernia  RECTAL (male): deferred  MS: no gross musculoskeletal defects noted, no edema  SKIN: no suspicious lesions or rashes  NEURO: Normal strength and tone, mentation intact and speech normal  PSYCH: mentation appears normal, affect " "normal/bright    Diagnostic Test Results:  Labs reviewed in Epic    ASSESSMENT/PLAN:   (Z00.00) Routine general medical examination at a health care facility  (primary encounter diagnosis)  Comment: discussed preventitive healthcare   Plan: Lipid Panel ([facility]), Comprehensive Metobolic         Panel ([facility]), HCL PSA, SCREENING ([facility]), VENOUS        COLLECTION        Continue to work on healthy diet and exercise, discussed healthy habits     (M51.26) Displacement of lumbar intervertebral disc without myelopathy  Comment: doing OK, some recent symptoms , previous surgery  Plan: core exercises, consider PT    (Z12.11) Special screening for malignant neoplasms, colon  Comment:   Plan: GASTROENTEROLOGY ADULT REF PROCEDURE ONLY         Other; [facility] [phone]              COUNSELING:  Reviewed preventive health counseling, as reflected in patient instructions       Regular exercise       Healthy diet/nutrition       Vision screening       Immunizations    Recommend shingrix             Colon cancer screening       Prostate cancer screening    Estimated body mass index is 27.15 kg/m  as calculated from the following:    Height as of this encounter: 2.057 m (6' 9\").    Weight as of this encounter: 114.9 kg (253 lb 6.4 oz).    Weight management plan: Discussed healthy diet and exercise guidelines     reports that he has never smoked. He has never used smokeless tobacco.      Counseling Resources:  ATP IV Guidelines  Pooled Cohorts Equation Calculator  FRAX Risk Assessment  ICSI Preventive Guidelines  Dietary Guidelines for Americans, 2010  USDA's MyPlate  ASA Prophylaxis  Lung CA Screening    Isak Granados MD  [facility]  "

## 2019-08-21 LAB — ABBOTT PSA - QUEST: 1.2 NG/ML

## 2019-10-11 ENCOUNTER — ALLIED HEALTH/NURSE VISIT (OUTPATIENT)
Dept: FAMILY MEDICINE | Facility: CLINIC | Age: 49
End: 2019-10-11

## 2019-10-11 DIAGNOSIS — Z23 NEED FOR VACCINATION: Primary | ICD-10-CM

## 2019-10-11 PROCEDURE — 90471 IMMUNIZATION ADMIN: CPT | Performed by: FAMILY MEDICINE

## 2019-10-11 PROCEDURE — 90686 IIV4 VACC NO PRSV 0.5 ML IM: CPT | Performed by: FAMILY MEDICINE

## 2019-10-24 ENCOUNTER — OFFICE VISIT (OUTPATIENT)
Dept: FAMILY MEDICINE | Facility: CLINIC | Age: 49
End: 2019-10-24

## 2019-10-24 VITALS
BODY MASS INDEX: 30.2 KG/M2 | DIASTOLIC BLOOD PRESSURE: 84 MMHG | TEMPERATURE: 98 F | OXYGEN SATURATION: 98 % | SYSTOLIC BLOOD PRESSURE: 124 MMHG | HEIGHT: 78 IN | WEIGHT: 261 LBS | HEART RATE: 68 BPM

## 2019-10-24 DIAGNOSIS — M54.42 ACUTE LEFT-SIDED LOW BACK PAIN WITH LEFT-SIDED SCIATICA: Primary | ICD-10-CM

## 2019-10-24 PROCEDURE — 90471 IMMUNIZATION ADMIN: CPT | Performed by: FAMILY MEDICINE

## 2019-10-24 PROCEDURE — 90714 TD VACC NO PRESV 7 YRS+ IM: CPT | Performed by: FAMILY MEDICINE

## 2019-10-24 PROCEDURE — 99213 OFFICE O/P EST LOW 20 MIN: CPT | Mod: 25 | Performed by: FAMILY MEDICINE

## 2019-10-24 RX ORDER — CARISOPRODOL 350 MG/1
350 TABLET ORAL
Qty: 12 TABLET | Refills: 0 | Status: SHIPPED | OUTPATIENT
Start: 2019-10-24 | End: 2020-05-29

## 2019-10-24 RX ORDER — METHYLPREDNISOLONE 4 MG
TABLET, DOSE PACK ORAL
Qty: 21 TABLET | Refills: 0 | Status: SHIPPED | OUTPATIENT
Start: 2019-10-24 | End: 2020-05-29

## 2019-10-24 RX ORDER — NAPROXEN SODIUM 220 MG
220 TABLET ORAL 2 TIMES DAILY WITH MEALS
COMMUNITY
End: 2020-05-29

## 2019-10-24 ASSESSMENT — MIFFLIN-ST. JEOR: SCORE: 2224.77

## 2019-10-24 NOTE — NURSING NOTE
Jovon is here today for lower back pain.    Pre-visit Screening:  Immunizations:  not up to date - TD today  Colonoscopy: up to date  Mammogram: NA  Asthma Action Test/Plan:  NA  PHQ9:  PHQ2  Done today  GAD7:  NA  Questioned patient about current smoking habits Pt. has never smoked.  Ok to leave detailed message on voice mail for today's visit only Yes, phone # [phone]

## 2019-10-24 NOTE — PROGRESS NOTES
SUBJECTIVE:   Jovon Loya is a 49 year old male who complains of right  low back pain for 6 months, left for a few weeks, both positional with bending or lifting, with radiation down the legs-mainly on left. Precipitating factors: none recalled by the patient. Prior history of back problems: previous spinal surgery - lumbar foraminotomy and discectomy  and again -then saw Dr Null for pain help with injections. There is no numbness in the legs now-had one episode tingling in buttocks last week but resolved.    No fecal incontinence, saddle numbness, fever, or weakness.    Pt has tried pain meds in past but did not like any narcotics.  He did respond in past to steroids and soma only for sleep help.  He is currently using naproxen with minimal results.    Pt doing home exercises for core    Patient Active Problem List   Diagnosis     Displacement of lumbar intervertebral disc without myelopathy     FAM HX-CARDIOVAS DIS NEC     Health Care Home     Family history of malignant neoplasm of prostate     Mixed hyperlipidemia     Abnormal glucose     ACP (advance care planning)     Past Medical History:   Diagnosis Date     Displacement of lumbar intervertebral disc without myelopathy      FAMILY HX-CARDIOVAS DIS NEC      FAMILY HX-CARDIOVAS DIS NEC     Father had MI at age 44     LUMBAR DISC DISPLACEMENT      Mixed hyperlipidemia 8/10/2001     Pityriasis versicolor 2004     Family History   Problem Relation Age of Onset     Alcohol/Drug Mother      Heart Disease Father          44 MI     Lipids Maternal Grandmother      Prostate Cancer Maternal Grandfather      Cardiovascular Brother         MVR     Cancer No family hx of      Diabetes No family hx of      Cancer - colorectal No family hx of      Cerebrovascular Disease No family hx of      Social History     Socioeconomic History     Marital status:      Spouse name: Zonia     Number of children: 1     Years of education: 18     Highest  education level: Not on file   Occupational History     Occupation: Investigator     Employer: [employer]     Comment: [place] [employer]   Social Needs     Financial resource strain: Not on file     Food insecurity:     Worry: Not on file     Inability: Not on file     Transportation needs:     Medical: Not on file     Non-medical: Not on file   Tobacco Use     Smoking status: Never Smoker     Smokeless tobacco: Never Used   Substance and Sexual Activity     Alcohol use: No     Drug use: No     Sexual activity: Yes     Partners: Female   Lifestyle     Physical activity:     Days per week: Not on file     Minutes per session: Not on file     Stress: Not on file   Relationships     Social connections:     Talks on phone: Not on file     Gets together: Not on file     Attends [employer]ist service: Not on file     Active member of club or organization: Not on file     Attends meetings of clubs or organizations: Not on file     Relationship status: Not on file     Intimate partner violence:     Fear of current or ex partner: Not on file     Emotionally abused: Not on file     Physically abused: Not on file     Forced sexual activity: Not on file   Other Topics Concern      Service Not Asked     Blood Transfusions Not Asked     Caffeine Concern Not Asked     Occupational Exposure Not Asked     Hobby Hazards Not Asked     Sleep Concern Not Asked     Stress Concern Not Asked     Weight Concern Not Asked     Special Diet Not Asked     Back Care Not Asked     Exercise Yes     Bike Helmet Not Asked     Seat Belt Yes     Self-Exams Yes     Parent/sibling w/ CABG, MI or angioplasty before 65F 55M? Yes   Social History Narrative     Not on file     Past Surgical History:   Procedure Laterality Date     C GUERRA W/O FACETEC FORAMOT/[facility]KC 1/2 VRT SEG, LUMBAR  97     C GUERRA W/O FACETEC FORAMOT/DSKC 1/2 VRT SEG, LUMBAR  98     naproxen sodium (ANAPROX) 220 MG tablet, Take 220 mg by mouth 2 times daily (with meals)    No current  "facility-administered medications on file prior to visit.        Allergies: Morphine    Immunization History   Administered Date(s) Administered     Influenza (IIV3) PF 10/30/2003     Influenza Vaccine IM > 6 months Valent IIV4 09/13/2016, 10/11/2019     TD (ADULT, 7+) 05/27/1998     TDAP Vaccine (Adacel) 02/12/2009        OBJECTIVE:  /84 (BP Location: Left arm, Patient Position: Sitting, Cuff Size: Adult Large)   Pulse 68   Temp 98  F (36.7  C) (Oral)   Ht 2.057 m (6' 9\")   Wt 118.4 kg (261 lb)   SpO2 98%   BMI 27.97 kg/m     Patient appears to be in mild to moderate pain, antalgic gait noted. Lumbosacral spine area reveals no local tenderness or mass.  Painful and reduced LS ROM noted. Straight leg raise is equivacol at 70 degrees on left. DTR's, motor strength and sensation normal, including heel and toe gait.  Peripheral pulses are palpable. X-Ray: not indicated-will consider MRI in future if not better.    ASSESSMENT:   lumbar strain and possible herniated disc left, hx herniated disks on right    PLAN:we agree to try steroid taper, OK for some soma to use only at night, consider referral back to Dr Null and /or order MRI   Discussed longer term treatment plan of prn NSAID's and discussed a home back care exercise program with flexion exercise routine. Proper lifting with avoidance of heavy lifting discussed. Consider Physical Therapy and XRay studies if not improving. Call or return to clinic prn if these symptoms worsen or fail to improve as anticipated.  "

## 2019-11-27 ENCOUNTER — TRANSFERRED RECORDS (OUTPATIENT)
Dept: FAMILY MEDICINE | Facility: CLINIC | Age: 49
End: 2019-11-27

## 2020-02-13 ENCOUNTER — TRANSFERRED RECORDS (OUTPATIENT)
Dept: FAMILY MEDICINE | Facility: CLINIC | Age: 50
End: 2020-02-13

## 2020-02-21 ENCOUNTER — TRANSFERRED RECORDS (OUTPATIENT)
Dept: FAMILY MEDICINE | Facility: CLINIC | Age: 50
End: 2020-02-21

## 2020-03-12 ENCOUNTER — TRANSFERRED RECORDS (OUTPATIENT)
Dept: FAMILY MEDICINE | Facility: CLINIC | Age: 50
End: 2020-03-12

## 2020-03-27 ENCOUNTER — TRANSFERRED RECORDS (OUTPATIENT)
Dept: FAMILY MEDICINE | Facility: CLINIC | Age: 50
End: 2020-03-27

## 2020-05-27 ENCOUNTER — TRANSFERRED RECORDS (OUTPATIENT)
Dept: FAMILY MEDICINE | Facility: CLINIC | Age: 50
End: 2020-05-27

## 2020-05-29 ENCOUNTER — OFFICE VISIT (OUTPATIENT)
Dept: FAMILY MEDICINE | Facility: CLINIC | Age: 50
End: 2020-05-29

## 2020-05-29 VITALS
WEIGHT: 253.8 LBS | TEMPERATURE: 98.1 F | OXYGEN SATURATION: 98 % | BODY MASS INDEX: 29.36 KG/M2 | DIASTOLIC BLOOD PRESSURE: 82 MMHG | SYSTOLIC BLOOD PRESSURE: 124 MMHG | HEIGHT: 78 IN | HEART RATE: 83 BPM

## 2020-05-29 DIAGNOSIS — Z01.818 PREOPERATIVE EXAMINATION: Primary | ICD-10-CM

## 2020-05-29 DIAGNOSIS — M51.26 LUMBAR DISC HERNIATION: ICD-10-CM

## 2020-05-29 LAB — HEMOGLOBIN: 15.3 G/DL (ref 13.3–17.7)

## 2020-05-29 PROCEDURE — 99214 OFFICE O/P EST MOD 30 MIN: CPT | Performed by: FAMILY MEDICINE

## 2020-05-29 PROCEDURE — 36415 COLL VENOUS BLD VENIPUNCTURE: CPT | Performed by: FAMILY MEDICINE

## 2020-05-29 PROCEDURE — 85018 HEMOGLOBIN: CPT | Performed by: FAMILY MEDICINE

## 2020-05-29 RX ORDER — ACETAMINOPHEN 500 MG
500-1000 TABLET ORAL EVERY 6 HOURS PRN
COMMUNITY

## 2020-05-29 ASSESSMENT — MIFFLIN-ST. JEOR: SCORE: 2187.11

## 2020-05-29 NOTE — PROGRESS NOTES
[facility]  [address]  [address]  [facility] [address]  [phone]  Dept: [phone]    PRE-OP EVALUATION:  Today's date: 2020    Jovon Loya (: 1970) presents for pre-operative evaluation assessment as requested by Dr. Gupta.  He requires evaluation and anesthesia risk assessment prior to undergoing surgery/procedure for treatment of microdiscectomy L5 S1 .    Proposed Surgery/ Procedure: microdiscectomy L5 S1  Date of Surgery/ Procedure: 20  Time of Surgery/ Procedure:   Hospital/Surgical Facility: [facility]  Primary Physician: Isak Granados  Type of Anesthesia Anticipated: to be determined  Patient has a Health Care Directive or Living Will:  NO    1. NO - Do you have a history of heart attack, stroke, stent, bypass or surgery on an artery in the head, neck, heart or legs?  2. NO - Do you ever have any pain or discomfort in your chest?  3. NO - Do you have a history of  Heart Failure?  4. NO - Are you troubled by shortness of breath when: walking on the level, up a slight hill or at night?  5. NO - Do you currently have a cold, bronchitis or other respiratory infection?  6. NO - Do you have a cough, shortness of breath or wheezing?  7. NO - Do you sometimes get pains in the calves of your legs when you walk?  8. NO - Do you or anyone in your family have previous history of blood clots?  9. NO - Do you or does anyone in your family have a serious bleeding problem such as prolonged bleeding following surgeries or cuts?  10. NO - Have you ever had problems with anemia or been told to take iron pills?  11. NO - Have you had any abnormal blood loss such as black, tarry or bloody stools, or abnormal vaginal bleeding?  12. NO - Have you ever had a blood transfusion?  13. NO - Have you or any of your relatives ever had problems with anesthesia?  14. NO - Do you have sleep apnea, excessive snoring or daytime drowsiness?  15. NO - Do  you have any prosthetic heart valves?  16. NO - Do you have prosthetic joints?  17. NO - Is there any chance that you may be pregnant?      HPI:     HPI related to upcoming procedure: lumbar disc herniation      See problem list for active medical problems.  Problems all longstanding and stable, except as noted/documented.  See ROS for pertinent symptoms related to these conditions.      MEDICAL HISTORY:     Patient Active Problem List    Diagnosis Date Noted     ACP (advance care planning) 09/13/2016     Priority: Medium     Advance Care Planning 9/13/2016: ACP Review of Chart / Resources Provided:  Reviewed chart for advance care plan.  Jovon DAVION Loya has no plan or code status on file. Discussed available resources and provided with information. Confirmed code status reflects current choices pending further ACP discussions.  Confirmed/documented legally designated decision makers.  Added by Jesica Shipman             Mixed hyperlipidemia 06/29/2013     Priority: Medium     Abnormal glucose 06/29/2013     Priority: Medium     Problem list name updated by automated process. Provider to review       Family history of malignant neoplasm of prostate 06/27/2013     Priority: Medium     FAM HX-CARDIOVAS DIS NEC      Priority: Medium     Father had MI at age 44       Displacement of lumbar intervertebral disc without myelopathy      Priority: Medium     Health Care Home 06/27/2013     Priority: Low     State Tier Level:  Tier 1  Status:  n/a  Care Coordinator:  See Letters for [facility] Care Plan              Past Medical History:   Diagnosis Date     Displacement of lumbar intervertebral disc without myelopathy      FAMILY HX-CARDIOVAS DIS NEC      FAMILY HX-CARDIOVAS DIS NEC     Father had MI at age 44     LUMBAR DISC DISPLACEMENT      Mixed hyperlipidemia 8/10/2001     Pityriasis versicolor 5/28/2004     Past Surgical History:   Procedure Laterality Date     C GUERRA W/O FACETEC FORAMOT/DSKC 1/2 VRT SEG, LUMBAR  97     C  "GUERRA W/O FACETEC FORAMOT/DSKC 1/2 VRT SEG, LUMBAR  98     Current Outpatient Medications   Medication Sig Dispense Refill     carisoprodol (SOMA) 350 MG tablet Take 1 tablet (350 mg) by mouth nightly as needed for muscle spasms 12 tablet 0     methylPREDNISolone (MEDROL DOSEPAK) 4 MG tablet therapy pack Follow Package Directions 21 tablet 0     naproxen sodium (ANAPROX) 220 MG tablet Take 220 mg by mouth 2 times daily (with meals)       OTC products: None, except as noted above    Allergies   Allergen Reactions     Morphine      NAUSEA      Latex Allergy: NO    Social History     Tobacco Use     Smoking status: Never Smoker     Smokeless tobacco: Never Used   Substance Use Topics     Alcohol use: No     History   Drug Use No       REVIEW OF SYSTEMS:   CONSTITUTIONAL: NEGATIVE for fever, chills, change in weight  ENT/MOUTH: NEGATIVE for ear, mouth and throat problems  RESP: NEGATIVE for significant cough or SOB  CV: NEGATIVE for chest pain, palpitations or peripheral edema    EXAM:   /82 (BP Location: Left arm, Patient Position: Sitting, Cuff Size: Adult Large)   Pulse 83   Temp 98.1  F (36.7  C) (Oral)   Ht 2.057 m (6' 9\")   Wt 115.1 kg (253 lb 12.8 oz)   SpO2 98%   BMI 27.20 kg/m    GENERAL APPEARANCE: healthy, alert and no distress  HENT: ear canals and TM's normal and nose and mouth without ulcers or lesions  RESP: lungs clear to auscultation - no rales, rhonchi or wheezes  CV: regular rate and rhythm, normal S1 S2, no S3 or S4 and no murmur, click or rub   ABDOMEN: soft, nontender, no HSM or masses and bowel sounds normal  NEURO: Normal strength and tone, sensory exam grossly normal, mentation intact and speech normal    DIAGNOSTICS:     EKG: Not indicated due to non-vascular surgery and low risk of event (age <65 and without cardiac risk factors)  Labs Resulted Today:   Results for orders placed or performed in visit on 05/29/20   CL AFF HEMOGLOBIN (BFP)     Status: None   Result Value Ref Range    " Hemoglobin 15.3 13.3 - 17.7 g/dL       Recent Labs   Lab Test 08/20/19 09/13/16  1109   .0 140   POTASSIUM 4.57 4.3   CR 1.21* 1.12        IMPRESSION:   Reason for surgery/procedure: lumbar disc herniation    The proposed surgical procedure is considered INTERMEDIATE risk.    REVISED CARDIAC RISK INDEX  The patient has the following serious cardiovascular risks for perioperative complications such as (MI, PE, VFib and 3  AV Block):  No serious cardiac risks  INTERPRETATION: 0 risks: Class I (very low risk - 0.4% complication rate)    The patient has the following additional risks for perioperative complications:  No identified additional risks      ICD-10-CM    1. Preoperative examination  Z01.818    2. Lumbar disc herniation  M51.26        RECOMMENDATIONS:         --Patient is on no chronic medications    APPROVAL GIVEN to proceed with proposed procedure, without further diagnostic evaluation       Signed Electronically by: Isak Granados MD    Copy of this evaluation report is provided to requesting physician.    [facility] Preop Guidelines    Revised Cardiac Risk Index

## 2020-06-02 ENCOUNTER — TELEPHONE (OUTPATIENT)
Dept: FAMILY MEDICINE | Facility: CLINIC | Age: 50
End: 2020-06-02

## 2020-06-02 NOTE — TELEPHONE ENCOUNTER
Natalie from Dr Gupta's office called: Pt is having surgery on Friday and they are needing a referral from PCP per Mayuri Charles. Informed Natalie that you will be back in the office on Thursday.   I am not sure how or why we would need to do this as we are not doing the surgery and we are apart of [facility]. Natalie will also be calling to see what they need

## 2020-06-04 NOTE — TELEPHONE ENCOUNTER
On Line referral issued for [facility]     Referral #: [id number]  Start Date: 6/4/2020  End Date: 7/31/2020  Visits: 3     I gave above Referral information to Dr. Alonso Estrada's Care Coordinator

## 2020-06-05 ENCOUNTER — TRANSFERRED RECORDS (OUTPATIENT)
Dept: HEALTH INFORMATION MANAGEMENT | Facility: CLINIC | Age: 50
End: 2020-06-05

## 2020-06-26 ENCOUNTER — TRANSFERRED RECORDS (OUTPATIENT)
Dept: FAMILY MEDICINE | Facility: CLINIC | Age: 50
End: 2020-06-26

## 2020-07-16 ENCOUNTER — TRANSFERRED RECORDS (OUTPATIENT)
Dept: FAMILY MEDICINE | Facility: CLINIC | Age: 50
End: 2020-07-16

## 2020-09-02 ENCOUNTER — TRANSFERRED RECORDS (OUTPATIENT)
Dept: HEALTH INFORMATION MANAGEMENT | Facility: CLINIC | Age: 50
End: 2020-09-02

## 2020-09-04 ENCOUNTER — TRANSFERRED RECORDS (OUTPATIENT)
Dept: FAMILY MEDICINE | Facility: CLINIC | Age: 50
End: 2020-09-04

## 2020-09-25 ENCOUNTER — TRANSFERRED RECORDS (OUTPATIENT)
Dept: FAMILY MEDICINE | Facility: CLINIC | Age: 50
End: 2020-09-25

## 2020-10-02 ENCOUNTER — TRANSFERRED RECORDS (OUTPATIENT)
Dept: FAMILY MEDICINE | Facility: CLINIC | Age: 50
End: 2020-10-02

## 2020-10-22 ENCOUNTER — TRANSFERRED RECORDS (OUTPATIENT)
Dept: FAMILY MEDICINE | Facility: CLINIC | Age: 50
End: 2020-10-22

## 2020-11-16 ENCOUNTER — TELEPHONE (OUTPATIENT)
Dept: FAMILY MEDICINE | Facility: CLINIC | Age: 50
End: 2020-11-16

## 2020-11-16 DIAGNOSIS — M51.26 DISPLACEMENT OF LUMBAR INTERVERTEBRAL DISC WITHOUT MYELOPATHY: Primary | ICD-10-CM

## 2020-11-16 DIAGNOSIS — M54.16 LUMBAR RADICULOPATHY: ICD-10-CM

## 2020-11-16 NOTE — TELEPHONE ENCOUNTER
I talked with patient RE physical therapy. He did menton the surgery he had with Dr. Gupta, L5-S1. He also mentioned the nerve pain.  Patient did say that he will come in if needed.

## 2020-11-16 NOTE — TELEPHONE ENCOUNTER
I am ok referring but can you clarify which issue he is working on fixing- ie  Radicular back pain, postlaminectomy syndrome, chronic back pain

## 2021-04-12 ENCOUNTER — OFFICE VISIT (OUTPATIENT)
Dept: FAMILY MEDICINE | Facility: CLINIC | Age: 51
End: 2021-04-12

## 2021-04-12 VITALS
HEART RATE: 80 BPM | TEMPERATURE: 97.2 F | BODY MASS INDEX: 31.75 KG/M2 | WEIGHT: 274.4 LBS | DIASTOLIC BLOOD PRESSURE: 88 MMHG | SYSTOLIC BLOOD PRESSURE: 124 MMHG | RESPIRATION RATE: 20 BRPM | HEIGHT: 78 IN

## 2021-04-12 DIAGNOSIS — M96.1 POST LAMINECTOMY SYNDROME: ICD-10-CM

## 2021-04-12 DIAGNOSIS — M54.16 LUMBAR RADICULOPATHY: ICD-10-CM

## 2021-04-12 DIAGNOSIS — M79.18 LEFT BUTTOCK PAIN: Primary | ICD-10-CM

## 2021-04-12 PROCEDURE — 99213 OFFICE O/P EST LOW 20 MIN: CPT | Performed by: FAMILY MEDICINE

## 2021-04-12 RX ORDER — CYCLOBENZAPRINE HCL 10 MG
10 TABLET ORAL PRN
COMMUNITY
Start: 2021-01-10 | End: 2021-04-12

## 2021-04-12 RX ORDER — CYCLOBENZAPRINE HCL 10 MG
10 TABLET ORAL PRN
Qty: 30 TABLET | Refills: 0 | Status: SHIPPED | OUTPATIENT
Start: 2021-04-12 | End: 2021-09-20

## 2021-04-12 RX ORDER — GABAPENTIN 300 MG/1
300 CAPSULE ORAL
Qty: 90 CAPSULE | Refills: 1 | Status: SHIPPED | OUTPATIENT
Start: 2021-04-12 | End: 2021-09-20

## 2021-04-12 RX ORDER — GABAPENTIN 300 MG/1
300 CAPSULE ORAL
COMMUNITY
Start: 2020-10-24 | End: 2021-04-12

## 2021-04-12 ASSESSMENT — MIFFLIN-ST. JEOR: SCORE: 2280.55

## 2021-04-12 NOTE — PROGRESS NOTES
SUBJECTIVE:  Jovon Loya, a 50 year old male scheduled an appointment to discuss the following issues:     Left buttock pain  Lumbar radiculopathy  Post laminectomy syndrome  Pt with ongoing left buttock pain- had L5S1 discectomy which did not help.  Pt had been seeing Dr Null for pain and had another MRI - no clear nerve compression issues but partial herniation.  Pt was then referred to Dr Jovon Morin at [facility]- offered fusion if not better in 90 days. PT went to Dr Gupta for another opinion who did not know where pain coming from and did not recommend fusion- referred back to PT and Dr Null, pt then went to  Pain clinic to consider nerve stimulator- had 3 injections but did not get stimulator yet.    PT not sure what to do next so he went to [facility]- multiple treatments- pain down from 8 to 6.  Nawaf Mayen told pt his hip rotators are in chronic spasm- mainly piriformis.    PT not sure what to do next.  He is using gabapentin 300 mg in evening-has never tried during the day.      Medical, social, surgical, and family histories reviewed.        Patient Active Problem List   Diagnosis     Displacement of lumbar intervertebral disc without myelopathy     FAM HX-CARDIOVAS DIS NEC     Health Care Home     Family history of malignant neoplasm of prostate     Mixed hyperlipidemia     Abnormal glucose     ACP (advance care planning)     Past Medical History:   Diagnosis Date     Displacement of lumbar intervertebral disc without myelopathy      FAMILY HX-CARDIOVAS DIS NEC      FAMILY HX-CARDIOVAS DIS NEC     Father had MI at age 44     LUMBAR DISC DISPLACEMENT      Mixed hyperlipidemia 8/10/2001     Pityriasis versicolor 2004     Family History   Problem Relation Age of Onset     Alcohol/Drug Mother      Heart Disease Father          44 MI     Lipids Maternal Grandmother      Prostate Cancer Maternal Grandfather      Cardiovascular Brother         MVR     Cancer No family hx of       Diabetes No family hx of      Cancer - colorectal No family hx of      Cerebrovascular Disease No family hx of      Social History     Socioeconomic History     Marital status:      Spouse name: Zonia     Number of children: 1     Years of education: 18     Highest education level: Not on file   Occupational History     Occupation: Investigator     Employer: [employer]     Comment: [place] [employer]   Social Needs     Financial resource strain: Not on file     Food insecurity     Worry: Not on file     Inability: Not on file     Transportation needs     Medical: Not on file     Non-medical: Not on file   Tobacco Use     Smoking status: Never Smoker     Smokeless tobacco: Never Used   Substance and Sexual Activity     Alcohol use: No     Drug use: No     Sexual activity: Yes     Partners: Female   Lifestyle     Physical activity     Days per week: Not on file     Minutes per session: Not on file     Stress: Not on file   Relationships     Social connections     Talks on phone: Not on file     Gets together: Not on file     Attends Christian service: Not on file     Active member of club or organization: Not on file     Attends meetings of clubs or organizations: Not on file     Relationship status: Not on file     Intimate partner violence     Fear of current or ex partner: Not on file     Emotionally abused: Not on file     Physically abused: Not on file     Forced sexual activity: Not on file   Other Topics Concern      Service Not Asked     Blood Transfusions Not Asked     Caffeine Concern Not Asked     Occupational Exposure Not Asked     Hobby Hazards Not Asked     Sleep Concern Not Asked     Stress Concern Not Asked     Weight Concern Not Asked     Special Diet Not Asked     Back Care Not Asked     Exercise Yes     Bike Helmet Not Asked     Seat Belt Yes     Self-Exams Yes     Parent/sibling w/ CABG, MI or angioplasty before 65F 55M? Yes   Social History Narrative     Not on file     Past  "Surgical History:   Procedure Laterality Date     C GUERRA W/O FACETEC FORAMOT/DSKC 1/2 VRT SEG, LUMBAR  97     C GUERRA W/O FACETEC FORAMOT/DSKC 1/2 VRT SEG, LUMBAR  98     acetaminophen (TYLENOL) 500 MG tablet, Take 500-1,000 mg by mouth every 6 hours as needed for mild pain  cyclobenzaprine (FLEXERIL) 10 MG tablet, Take 10 mg by mouth as needed  gabapentin (NEURONTIN) 300 MG capsule, Take 300 mg by mouth nightly as needed    No current facility-administered medications on file prior to visit.        Allergies: Morphine    Immunization History   Administered Date(s) Administered     Influenza (IIV3) PF 10/30/2003     Influenza Vaccine IM > 6 months Valent IIV4 09/13/2016, 10/11/2019     TD (ADULT, 7+) 05/27/1998, 10/24/2019     TDAP Vaccine (Adacel) 02/12/2009        ROS:  CONSTITUTIONAL: NEGATIVE for fever, chills  EYES: NEGATIVE for vision changes   RESP: NEGATIVE for significant cough or SOB  CV: NEGATIVE for chest pain, palpitations   GI: NEGATIVE for nausea, abdominal pain, heartburn, or change in bowel habits  : NEGATIVE for frequency, dysuria, or hematuria    OBJECTIVE:  /88 (BP Location: Left arm, Patient Position: Chair, Cuff Size: Adult Large)   Pulse 80   Temp 97.2  F (36.2  C)   Resp 20   Ht 2.057 m (6' 9\")   Wt 124.5 kg (274 lb 6.4 oz)   BMI 29.40 kg/m    EXAM:  GENERAL APPEARANCE: healthy, alert and no distress  EYES: EOMI,  PERRL  HENT: ear canals and TM's normal and nose and mouth without ulcers or lesions  RESP: lungs clear to auscultation - no rales, rhonchi or wheezes  CV: regular rates and rhythm, normal S1 S2, no S3 or S4 and no murmur, click or rub -  ABDOMEN:  soft, nontender, no HSM or masses and bowel sounds normal  MS: decreased lumbar ROM  PSYCH: mentation appears normal and affect normal/bright    ASSESSMENT/PLAN:  (M79.18) Left buttock pain  (primary encounter diagnosis)  Comment: Patient is having persistent symptoms despite previous therapies. May be piriformis " syndrome  Plan: recommend he try gabapentin more often as may not have great options for other therapies    Recommend more PT with [facility] if this does not help    (M54.16) Lumbar radiculopathy  Comment as above  Plan:     (M96.1) Post laminectomy syndrome  Comment: as above  Plan:

## 2021-04-12 NOTE — NURSING NOTE
Questioned patient about current smoking habits.  Pt. has never smoked.  PULSE regular  My Chart: active  CLASSIFICATION OF OVERWEIGHT AND OBESITY BY BMI                        Obesity Class           BMI(kg/m2)  Underweight                                    < 18.5  Normal                                         18.5-24.9  Overweight                                     25.0-29.9  OBESITY                     I                  30.0-34.9                             II                 35.0-39.9  EXTREME OBESITY             III                >40                            Patient's  BMI Body mass index is 29.4 kg/m .  http://hin.nhlbi.nih.gov/menuplanner/menu.cgi  Pre-visit planning  Immunizations - up to date  Colonoscopy - is up to date  Mammogram -   Asthma -   PHQ9 -    KARMA-7 -

## 2021-07-13 ENCOUNTER — TELEPHONE (OUTPATIENT)
Dept: FAMILY MEDICINE | Facility: CLINIC | Age: 51
End: 2021-07-13

## 2021-07-13 DIAGNOSIS — M54.16 LUMBAR RADICULOPATHY: Primary | ICD-10-CM

## 2021-07-13 NOTE — TELEPHONE ENCOUNTER
Patient called asking for the referral / order for Physical Therapy @ [facility] for low lumbar pain. Patient also stated that his right side is starting to have pain -- his right side is his good side.  Patient stated that this was talked about at his last visit, 4/12/21    Please complete & sign the pending PT referral. I will fax to     [facility]   [place] [address]  [address] [place] [address]  [phone] - appt line  [phone] - fax    Thank you

## 2021-07-13 NOTE — TELEPHONE ENCOUNTER
Referral for PT was faxed to     [facility]  [address] [place] [address]  [phone] - appt line  [phone] - fax    Online referral was issued to [facility]    Referral # : [id number]  Start Date: 7/13/2021  End Date: 12/31/2021   Visits: 50    I left a message for patient with above information.

## 2021-09-15 ENCOUNTER — TELEPHONE (OUTPATIENT)
Dept: FAMILY MEDICINE | Facility: CLINIC | Age: 51
End: 2021-09-15

## 2021-09-15 NOTE — TELEPHONE ENCOUNTER
Patient left a message asking for a referral to see Dr. Ronnie Zhang for his back. Was also advised that he will need to have a MRI before his 10/5/2021 appointment with     Dr. Ronine Zhang   [facility]  [address]  [place] [address]  [phone] - phone  [phone] - fax     I left a message for patient that he will need to schedule with Dr. Granados for the MRI order/referral. At that appointment he can talk about referral for Dr. Ronnie Zhang with [facility]     PIYUSH

## 2021-09-20 ENCOUNTER — OFFICE VISIT (OUTPATIENT)
Dept: FAMILY MEDICINE | Facility: CLINIC | Age: 51
End: 2021-09-20

## 2021-09-20 VITALS
SYSTOLIC BLOOD PRESSURE: 130 MMHG | HEIGHT: 78 IN | TEMPERATURE: 97.1 F | WEIGHT: 267 LBS | DIASTOLIC BLOOD PRESSURE: 88 MMHG | BODY MASS INDEX: 30.89 KG/M2 | OXYGEN SATURATION: 98 % | HEART RATE: 76 BPM

## 2021-09-20 DIAGNOSIS — M79.18 LEFT BUTTOCK PAIN: ICD-10-CM

## 2021-09-20 DIAGNOSIS — M54.16 LUMBAR RADICULOPATHY: ICD-10-CM

## 2021-09-20 DIAGNOSIS — M96.1 POST LAMINECTOMY SYNDROME: ICD-10-CM

## 2021-09-20 PROCEDURE — 90471 IMMUNIZATION ADMIN: CPT | Performed by: FAMILY MEDICINE

## 2021-09-20 PROCEDURE — 90686 IIV4 VACC NO PRSV 0.5 ML IM: CPT | Performed by: FAMILY MEDICINE

## 2021-09-20 PROCEDURE — 99213 OFFICE O/P EST LOW 20 MIN: CPT | Mod: 25 | Performed by: FAMILY MEDICINE

## 2021-09-20 RX ORDER — CYCLOBENZAPRINE HCL 10 MG
10 TABLET ORAL 2 TIMES DAILY PRN
Qty: 30 TABLET | Refills: 0 | Status: SHIPPED | OUTPATIENT
Start: 2021-09-20 | End: 2022-03-22

## 2021-09-20 RX ORDER — GABAPENTIN 300 MG/1
600 CAPSULE ORAL
Qty: 180 CAPSULE | Refills: 1 | Status: SHIPPED | OUTPATIENT
Start: 2021-09-20 | End: 2022-03-22

## 2021-09-20 ASSESSMENT — MIFFLIN-ST. JEOR: SCORE: 2241.98

## 2021-09-20 NOTE — PROGRESS NOTES
"SUBJECTIVE:  Jovon Loya, a 51 year old male scheduled an appointment to discuss the following issues:     Lumbar radiculopathy  Post laminectomy syndrome       Pt with ongoing left buttock pain- had L5S1 discectomy which did not help.  Pt had been seeing Dr Null for pain and had another MRI 9/20- no clear nerve compression issues but partial herniation.  Pt was then referred to Dr Jovon Morin at [facility]- offered fusion if not better in 90 days. PT went to Dr Gupta for another opinion who did not know where pain coming from and did not recommend fusion- referred back to PT and Dr Null, pt then went to  Pain clinic to consider nerve stimulator- had 3 injections but did not get stimulator yet.     PT not sure what to do next so he went to [facility]- multiple treatments- pain down from 8 to 6.  Nawaf Mayen told pt his hip rotators are in chronic spasm- mainly piriformis.    Pt states not really making any more progress- wants another opinion- has appt with Dr Ronnie Zhang at [facility]-they have asked for MRI prior ot visit     He is using gabapentin 300 mg in evening-has never tried during the day.      Medical, social, surgical, and family histories reviewed.    ROS:  CONSTITUTIONAL: NEGATIVE for fever, chills  RESP: NEGATIVE for significant cough or SOB  CV: NEGATIVE for chest pain, palpitations   GI: NEGATIVE for nausea, abdominal pain, heartburn, or change in bowel habits  : NEGATIVE for frequency, dysuria, or hematuria  NEURO: NEGATIVE for weakness, dizziness or paresthesias or headache  PSYCHIATRIC: NEGATIVE for changes in mood or affect    OBJECTIVE:  /88 (BP Location: Right arm, Patient Position: Sitting, Cuff Size: Adult Large)   Pulse 76   Temp 97.1  F (36.2  C) (Temporal)   Ht 2.057 m (6' 9\")   Wt 121.1 kg (267 lb)   SpO2 98%   BMI 28.61 kg/m    EXAM:  GENERAL APPEARANCE: healthy, alert and no distress  RESP: lungs clear to auscultation - no rales, rhonchi or " wheezes  CV: regular rates and rhythm, normal S1 S2, no S3 or S4 and no murmur, click or rub -  MS: extremities normal- no gross deformities noted, no evidence of inflammation in joints, FROM in all extremities.  SKIN: no suspicious lesions or rashes  NEURO: Normal strength and tone, sensory exam grossly normal, mentation intact and speech normal  PSYCH: mentation appears normal and affect normal/bright    ASSESSMENT/PLAN:  (M54.16) Lumbar radiculopathy  Comment: Patient is having persistent symptoms despite previous therapies. I will agree with mri order for upcoming surgery opinion  Plan: MRI and follow up Dr Zhang    (M96.1) Post laminectomy syndrome  Comment: as above  Plan:

## 2021-09-20 NOTE — NURSING NOTE
Chief Complaint   Patient presents with     Referral     Pt is requesting an MRI for spine. Pt has hx of spinal surgery by Dr. Cortes MUIR.         Pre-visit Screening:  Immunizations:  up to date  Colonoscopy:  is up to date  Mammogram: NA  Asthma Action Test/Plan:  NA  PHQ9:  NA  GAD7:  NA  Questioned patient about current smoking habits Pt. has never smoked.  Ok to leave detailed message on voice mail for today's visit only Yes, phone # cell

## 2021-09-27 ENCOUNTER — TRANSFERRED RECORDS (OUTPATIENT)
Dept: HEALTH INFORMATION MANAGEMENT | Facility: CLINIC | Age: 51
End: 2021-09-27

## 2021-10-01 NOTE — TELEPHONE ENCOUNTER
Patient called asking about referrals for Dr. Zhang with [facility] and [facility].     On Line Referrals have been issued for both    [facility]   Referral #: [id number]  Start Date: 10/1/2021  End Date: 12/31/2021  Visits: 25    [facility]  Referral #: [id number]  Start Date: 10/1/2021  End Date: 12/31/2021  Visits: 25

## 2021-10-25 ENCOUNTER — TRANSFERRED RECORDS (OUTPATIENT)
Dept: FAMILY MEDICINE | Facility: CLINIC | Age: 51
End: 2021-10-25

## 2021-11-08 NOTE — TELEPHONE ENCOUNTER
Melita with [facility] called asking for a referral for patients upcoming procedure at [facility] with Dr. Zhang 12/13/2021.     Patient is having procedure code 31497 -- Posterior Extradural Laminotomy or Laminectomy for Exploration/ Decompression of Neural Elements or Excision of Herniated Intervertebral Disks Procedures. Melita also gave procedure code 57649     I tried issuing the referral on line with [facility] [employer] -- could not find location. I issued the referral for [facility] via Fax -- [phone]    [address] [address] [address]  [place] [address]  [phone] -- phone  [phone] -- fax     Start Date: 11/8/2021   End Date: 12/31/2021   Visits: 3  NPI #: [id number] or [id number]    I will call Melita with [facility] ( [phone] ) with Referral information after [facility] process's referral.

## 2021-11-09 NOTE — TELEPHONE ENCOUNTER
[facility] / [facility] faxed Referral information for [facility]    Referral Auth #: [id number]  Start Date: 11/18/2021   End Date: 12/31/2021   Visits : 3    I called and gave Melita with [facility] ( [phone] ) above referral information for [facility], .

## 2021-12-02 ENCOUNTER — OFFICE VISIT (OUTPATIENT)
Dept: FAMILY MEDICINE | Facility: CLINIC | Age: 51
End: 2021-12-02

## 2021-12-02 VITALS
HEART RATE: 68 BPM | WEIGHT: 262.6 LBS | RESPIRATION RATE: 20 BRPM | BODY MASS INDEX: 30.38 KG/M2 | DIASTOLIC BLOOD PRESSURE: 88 MMHG | TEMPERATURE: 97.2 F | HEIGHT: 78 IN | SYSTOLIC BLOOD PRESSURE: 136 MMHG

## 2021-12-02 DIAGNOSIS — Z71.89 ACP (ADVANCE CARE PLANNING): ICD-10-CM

## 2021-12-02 DIAGNOSIS — M51.369 DDD (DEGENERATIVE DISC DISEASE), LUMBAR: ICD-10-CM

## 2021-12-02 DIAGNOSIS — Z01.818 PREOPERATIVE EXAMINATION: Primary | ICD-10-CM

## 2021-12-02 LAB
% GRANULOCYTES: 54.7 %
BUN SERPL-MCNC: 14 MG/DL (ref 7–25)
BUN/CREATININE RATIO: 13.1 (ref 6–22)
CALCIUM SERPL-MCNC: 10.4 MG/DL (ref 8.6–10.3)
CHLORIDE SERPLBLD-SCNC: 109.1 MMOL/L (ref 98–110)
CO2 SERPL-SCNC: 23.7 MMOL/L (ref 20–32)
CREAT SERPL-MCNC: 1.07 MG/DL (ref 0.6–1.3)
GLUCOSE SERPL-MCNC: 117 MG/DL (ref 60–99)
HCT VFR BLD AUTO: 47.5 % (ref 40–53)
HEMOGLOBIN: 15.9 G/DL (ref 13.3–17.7)
LYMPHOCYTES NFR BLD AUTO: 33.8 %
MCH RBC QN AUTO: 30.1 PG (ref 26–33)
MCHC RBC AUTO-ENTMCNC: 33.5 G/DL (ref 31–36)
MCV RBC AUTO: 89.7 FL (ref 78–100)
MONOCYTES NFR BLD AUTO: 11.5 %
PLATELET COUNT - QUEST: 250 10^9/L (ref 150–375)
POTASSIUM SERPL-SCNC: 4.64 MMOL/L (ref 3.5–5.3)
RBC # BLD AUTO: 5.29 10*12/L (ref 4.4–5.9)
SODIUM SERPL-SCNC: 143.3 MMOL/L (ref 135–146)
WBC # BLD AUTO: 6.1 10*9/L (ref 4–11)

## 2021-12-02 PROCEDURE — 36415 COLL VENOUS BLD VENIPUNCTURE: CPT | Performed by: FAMILY MEDICINE

## 2021-12-02 PROCEDURE — 99214 OFFICE O/P EST MOD 30 MIN: CPT | Mod: 25 | Performed by: FAMILY MEDICINE

## 2021-12-02 PROCEDURE — 93000 ELECTROCARDIOGRAM COMPLETE: CPT | Performed by: FAMILY MEDICINE

## 2021-12-02 PROCEDURE — 80048 BASIC METABOLIC PNL TOTAL CA: CPT | Performed by: FAMILY MEDICINE

## 2021-12-02 PROCEDURE — 85025 COMPLETE CBC W/AUTO DIFF WBC: CPT | Performed by: FAMILY MEDICINE

## 2021-12-02 ASSESSMENT — MIFFLIN-ST. JEOR: SCORE: 2222.03

## 2021-12-02 NOTE — NURSING NOTE
Jovon Loya is here for a pre-op exam.    Questioned patient about current smoking habits.  Pt. has never smoked.  PULSE regular  My Chart: active  CLASSIFICATION OF OVERWEIGHT AND OBESITY BY BMI                        Obesity Class           BMI(kg/m2)  Underweight                                    < 18.5  Normal                                         18.5-24.9  Overweight                                     25.0-29.9  OBESITY                     I                  30.0-34.9                             II                 35.0-39.9  EXTREME OBESITY             III                >40                            Patient's  BMI Body mass index is 28.14 kg/m .  http://hin.nhlbi.nih.gov/menuplanner/menu.cgi  Pre-visit planning  Immunizations - up to date  Colonoscopy - is up to date  Mammogram -   Asthma -   PHQ9 -    KARMA-7 -    Hearing Test -

## 2021-12-02 NOTE — PROGRESS NOTES
[facility]  [address]  [address]  [facility] [address]  Phone: [phone]  Fax: [phone]  Primary Provider: Ila Granados  Pre-op Performing Provider: ILA GRANADOS      PREOPERATIVE EVALUATION:  Today's date: 12/2/2021    Jovon Loya is a 51 year old male who presents for a preoperative evaluation.    Surgical Information:  Surgery/Procedure: left L4 interlaminar Hemilaminectomy and LS-S1 revision  Surgery Location: [facility]  Surgeon: Dr Jake Zhang-[facility]  Surgery Date: 12/13/21  Time of Surgery: am  Where patient plans to recover: At home with family  Fax number for surgical facility: [phone]    Type of Anesthesia Anticipated: to be determined    Assessment & Plan     The proposed surgical procedure is considered INTERMEDIATE risk.    Preoperative examination      DDD (degenerative disc disease), lumbar            Risks and Recommendations:  The patient has the following additional risks and recommendations for perioperative complications:   - No identified additional risk factors other than previously addressed    Medication Instructions:  Patient is to take all scheduled medications on the day of surgery    RECOMMENDATION:  APPROVAL GIVEN to proceed with proposed procedure, without further diagnostic evaluation.    Review of external notes as documented above   Review of the result(s) of each unique test - labs                  Subjective     1. No - Have you ever had a heart attack or stroke?  2. No - Have you ever had surgery on your heart or blood vessels, such as a stent, coronary (heart) bypass, or surgery on an artery in the head, neck, heart, or legs?  3. No - Do you have chest pain when you are physically active?  4. No - Do you have a history of heart failure?  5. No - Do you currently have a cold, bronchitis, or symptoms of other respiratory (head and chest) infections?  6. No - Do you have a cough,  shortness of breath, or wheezing?  7. No - Do you or anyone in your family have a history of blood clots?  8. No - Do you or anyone in your family have a serious bleeding problem, such as long-lasting bleeding after surgeries or cuts?  9. No - Have you ever had anemia or been told to take iron pills?  10. No - Have you had any abnormal blood loss such as black, tarry or bloody stools, or abnormal vaginal bleeding?  11. No - Have you ever had a blood transfusion?  12. Yes - Are you willing to have a blood transfusion if it is medically needed before, during, or after your surgery?  13. No - Have you or anyone in your family ever had problems with anesthesia (sedation for surgery)?  14. No - Do you have sleep apnea, excessive snoring, or daytime drowsiness?   15. No - Do you have any artifical heart valves or other implanted medical devices, such as a pacemaker, defibrillator, or continuous glucose monitor?  16. No - Do you have any artifical joints?  17. No - Are you allergic to latex?  18. No - Is there any chance that you may be pregnant?      No flowsheet data found.    Health Care Directive:  Patient does not have a Health Care Directive or Living Will: Discussed advance care planning with patient; information given to patient to review.    Preoperative Review of :   reviewed - no record of controlled substances prescribed.      Status of Chronic Conditions:  See problem list for active medical problems.  Problems all longstanding and stable, except as noted/documented.  See ROS for pertinent symptoms related to these conditions.      Review of Systems  CONSTITUTIONAL: NEGATIVE for fever, chills, change in weight  ENT/MOUTH: NEGATIVE for ear, mouth and throat problems  RESP: NEGATIVE for significant cough or SOB  CV: NEGATIVE for chest pain, palpitations or peripheral edema    Patient Active Problem List    Diagnosis Date Noted     ACP (advance care planning) 09/13/2016     Priority: Medium     Advance Care  Planning 9/13/2016: ACP Review of Chart / Resources Provided:  Reviewed chart for advance care plan.  Jovon DAVION Loya has no plan or code status on file. Discussed available resources and provided with information. Confirmed code status reflects current choices pending further ACP discussions.  Confirmed/documented legally designated decision makers.  Added by Jesica Shipman             Mixed hyperlipidemia 06/29/2013     Priority: Medium     Abnormal glucose 06/29/2013     Priority: Medium     Problem list name updated by automated process. Provider to review       Family history of malignant neoplasm of prostate 06/27/2013     Priority: Medium     FAM HX-CARDIOVAS DIS NEC      Priority: Medium     Father had MI at age 44       Displacement of lumbar intervertebral disc without myelopathy      Priority: Medium     Health Care Home 06/27/2013     Priority: Low     State Tier Level:  Tier 1  Status:  n/a  Care Coordinator:  See Letters for [facility] Care Plan              Past Medical History:   Diagnosis Date     Displacement of lumbar intervertebral disc without myelopathy      FAMILY HX-CARDIOVAS DIS NEC      FAMILY HX-CARDIOVAS DIS NEC     Father had MI at age 44     LUMBAR DISC DISPLACEMENT      Mixed hyperlipidemia 8/10/2001     Pityriasis versicolor 5/28/2004     Past Surgical History:   Procedure Laterality Date     C GUERRA W/O FACETEC FORAMOT/[facility] 1/2 VRT SEG, LUMBAR  97     C GUERRA W/O FACETEC FORAMOT/[facility]KC 1/2 VRT SEG, LUMBAR  98     Current Outpatient Medications   Medication Sig Dispense Refill     acetaminophen (TYLENOL) 500 MG tablet Take 500-1,000 mg by mouth every 6 hours as needed for mild pain       cyclobenzaprine (FLEXERIL) 10 MG tablet Take 1 tablet (10 mg) by mouth 2 times daily as needed 30 tablet 0     gabapentin (NEURONTIN) 300 MG capsule Take 2 capsules (600 mg) by mouth nightly as needed 180 capsule 1       Allergies   Allergen Reactions     Morphine      NAUSEA        Social History     Tobacco  "Use     Smoking status: Never Smoker     Smokeless tobacco: Never Used   Substance Use Topics     Alcohol use: No     Family History   Problem Relation Age of Onset     Alcohol/Drug Mother      Heart Disease Father          44 MI     Lipids Maternal Grandmother      Prostate Cancer Maternal Grandfather      Cardiovascular Brother         MVR     Cancer No family hx of      Diabetes No family hx of      Cancer - colorectal No family hx of      Cerebrovascular Disease No family hx of      History   Drug Use No         Objective     Resp 20   Ht 2.057 m (6' 9\")   Wt 119.1 kg (262 lb 9.6 oz)   BMI 28.14 kg/m      Physical Exam  GENERAL APPEARANCE: healthy, alert and no distress  HENT: ear canals and TM's normal and nose and mouth without ulcers or lesions  RESP: lungs clear to auscultation - no rales, rhonchi or wheezes  CV: regular rate and rhythm, normal S1 S2, no S3 or S4 and no murmur, click or rub   ABDOMEN: soft, nontender, no HSM or masses and bowel sounds normal  NEURO: Normal strength and tone, sensory exam grossly normal, mentation intact and speech normal    Recent Labs   Lab Test 20  0824   HGB 15.3        Diagnostics:  Recent Results (from the past 24 hour(s))   HEMOGRAM PLATELET DIFF (BFP)    Collection Time: 21 10:42 AM   Result Value Ref Range    WBC 6.1 4.0 - 11 10*9/L    RBC Count 5.29 4.4 - 5.9 10*12/L    Hemoglobin 15.9 13.3 - 17.7 g/dL    Hematocrit 47.5 40.0 - 53.0 %    MCV 89.7 78 - 100 fL    MCH 30.1 26 - 33 pg    MCHC 33.5 31 - 36 g/dL    Platelet Count 250 150 - 375 10^9/L    % Granulocytes 54.7 %    % Lymphocytes 33.8 %    % Monocytes 11.5 %      EKG: appears normal, NSR, normal axis, normal intervals, no acute ST/T changes c/w ischemia, no LVH by voltage criteria, unchanged from previous tracings    Revised Cardiac Risk Index (RCRI):  The patient has the following serious cardiovascular risks for perioperative complications:   - No serious cardiac risks = 0 points     RCRI " Interpretation: 0 points: Class I (very low risk - 0.4% complication rate)           Signed Electronically by: Isak Granados MD  Copy of this evaluation report is provided to requesting physician.

## 2021-12-13 ENCOUNTER — TRANSFERRED RECORDS (OUTPATIENT)
Dept: HEALTH INFORMATION MANAGEMENT | Facility: CLINIC | Age: 51
End: 2021-12-13

## 2022-01-04 ENCOUNTER — TELEPHONE (OUTPATIENT)
Dept: FAMILY MEDICINE | Facility: CLINIC | Age: 52
End: 2022-01-04

## 2022-01-04 DIAGNOSIS — M51.369 DDD (DEGENERATIVE DISC DISEASE), LUMBAR: Primary | ICD-10-CM

## 2022-01-04 DIAGNOSIS — M54.16 LUMBAR RADICULOPATHY: ICD-10-CM

## 2022-01-04 NOTE — TELEPHONE ENCOUNTER
Dr. Granados patient called asking for an updated Referral for Physical Therapy to see Nawaf with     [facility]   [facility] [place] [address]  [address] [address], [place] [address]  [phone] - appt line  [phone] - fax    As of 2022 patient has new insurance - [employer] -- This plan requires a referral.     A referral was issued 2021 for patient to see Nawaf with [facility]. This referral did  2021.     I did pend the PT referral. Please advise - will patient need to be seen or will you update the PT referral for  without office visit.     Patient call back # [phone]

## 2022-01-05 NOTE — TELEPHONE ENCOUNTER
I let patient know that the referral was issued and faxed  to     [address] [address]  [address] [address]  [phone] - appt line  [phone] - fax    On line Referral issued to     Referral #: [id number]  Start Date: 1/5/2022  End Date: 12/31/2022  Visits: 99    Patient will call to make his appt.

## 2022-03-22 ENCOUNTER — OFFICE VISIT (OUTPATIENT)
Dept: FAMILY MEDICINE | Facility: CLINIC | Age: 52
End: 2022-03-22

## 2022-03-22 VITALS
WEIGHT: 260.4 LBS | TEMPERATURE: 97.4 F | HEART RATE: 76 BPM | DIASTOLIC BLOOD PRESSURE: 88 MMHG | RESPIRATION RATE: 20 BRPM | BODY MASS INDEX: 27.9 KG/M2 | SYSTOLIC BLOOD PRESSURE: 122 MMHG

## 2022-03-22 DIAGNOSIS — M54.16 LUMBAR RADICULOPATHY: ICD-10-CM

## 2022-03-22 DIAGNOSIS — M79.18 LEFT BUTTOCK PAIN: ICD-10-CM

## 2022-03-22 DIAGNOSIS — M51.369 DDD (DEGENERATIVE DISC DISEASE), LUMBAR: Primary | ICD-10-CM

## 2022-03-22 DIAGNOSIS — M96.1 POST LAMINECTOMY SYNDROME: ICD-10-CM

## 2022-03-22 PROCEDURE — 99213 OFFICE O/P EST LOW 20 MIN: CPT | Performed by: FAMILY MEDICINE

## 2022-03-22 RX ORDER — CYCLOBENZAPRINE HCL 10 MG
10 TABLET ORAL 2 TIMES DAILY PRN
Qty: 60 TABLET | Refills: 0 | Status: SHIPPED | OUTPATIENT
Start: 2022-03-22 | End: 2022-10-13

## 2022-03-22 RX ORDER — GABAPENTIN 300 MG/1
600 CAPSULE ORAL 3 TIMES DAILY PRN
Qty: 540 CAPSULE | Refills: 1 | Status: SHIPPED | OUTPATIENT
Start: 2022-03-22 | End: 2022-10-13

## 2022-03-22 NOTE — NURSING NOTE
Jovon Loya is here for a medication check and refill.    Questioned patient about current smoking habits.  Pt. has never smoked.  PULSE regular  My Chart: active  CLASSIFICATION OF OVERWEIGHT AND OBESITY BY BMI                        Obesity Class           BMI(kg/m2)  Underweight                                    < 18.5  Normal                                         18.5-24.9  Overweight                                     25.0-29.9  OBESITY                     I                  30.0-34.9                             II                 35.0-39.9  EXTREME OBESITY             III                >40                            Patient's  BMI Body mass index is 27.9 kg/m .  http://hin.nhlbi.nih.gov/menuplanner/menu.cgi  Pre-visit planning  Immunizations - up to date  Colonoscopy - is up to date  Mammogram -   Asthma -   PHQ9 -    KARMA-7 -

## 2022-03-22 NOTE — PROGRESS NOTES
"  Assessment & Plan     DDD (degenerative disc disease), lumbar  Pt feels symptoms mainly unchanged after recent surgery- discussed option to use gabapenitn TID-he will try, I reviewed the risks, benefits, and possible side effects of the medication.  The patient had an opportunity to ask any questions regarding the treatment plan. The patient was encouraged to call my office if any problems.     Post laminectomy syndrome  As above    Lumbar radiculopathy  As above    Left buttock pain        Review of external notes as documented elsewhere in note  Review of the result(s) of each unique test - labs  Ordering of each unique test  Prescription drug management         BMI:   Estimated body mass index is 27.9 kg/m  as calculated from the following:    Height as of 12/2/21: 2.057 m (6' 9\").    Weight as of this encounter: 118.1 kg (260 lb 6.4 oz).   Weight management plan: Discussed healthy diet and exercise guidelines    FUTURE APPOINTMENTS:       - Follow-up visit in 6 mo  Work on weight loss  Regular exercise    No follow-ups on file.    Isak Granados MD  [facility]    Suzanna Linda is a 51 year old who presents for the following health issues     HPI     Chronic/Recurring Back Pain Follow Up      Where is your back pain located? (Select all that apply) low back bilateral    How would you describe your back pain?  dull ache and gnawing    Where does your back pain spread? the left buttock    Since your last clinic visit for back pain, how has your pain changed? unchanged    Does your back pain interfere with your job? YES    Since your last visit, have you tried any new treatment? Yes, has hemilaminectomy 12/21      How many servings of fruits and vegetables do you eat daily?  2-3    On average, how many sweetened beverages do you drink each day (Examples: soda, juice, sweet tea, etc.  Do NOT count diet or artificially sweetened beverages)?   1    How many days per week do you " exercise enough to make your heart beat faster? 3 or less    How many minutes a day do you exercise enough to make your heart beat faster? 20 - 29    How many days per week do you miss taking your medication? 0    Pt sees [facility] for PT- pt has only been using gabapentin BID- wonders about TID    Review of Systems   Constitutional, HEENT, cardiovascular, pulmonary, gi and gu systems are negative, except as otherwise noted.      Objective    /88 (BP Location: Right arm, Patient Position: Chair, Cuff Size: Adult Large)   Pulse 76   Temp 97.4  F (36.3  C)   Resp 20   Wt 118.1 kg (260 lb 6.4 oz)   BMI 27.90 kg/m    Body mass index is 27.9 kg/m .  Physical Exam   GENERAL: healthy, alert and no distress  NECK: no adenopathy, no asymmetry, masses, or scars and thyroid normal to palpation  RESP: lungs clear to auscultation - no rales, rhonchi or wheezes  CV: regular rate and rhythm, normal S1 S2, no S3 or S4, no murmur, click or rub, no peripheral edema and peripheral pulses strong  MS: no gross musculoskeletal defects noted, no edema  PSYCH: mentation appears normal, affect normal/bright

## 2022-05-02 ENCOUNTER — TRANSFERRED RECORDS (OUTPATIENT)
Dept: HEALTH INFORMATION MANAGEMENT | Facility: CLINIC | Age: 52
End: 2022-05-02

## 2022-05-16 ENCOUNTER — OFFICE VISIT (OUTPATIENT)
Dept: FAMILY MEDICINE | Facility: CLINIC | Age: 52
End: 2022-05-16

## 2022-05-16 VITALS
WEIGHT: 270.2 LBS | RESPIRATION RATE: 20 BRPM | HEART RATE: 84 BPM | HEIGHT: 78 IN | DIASTOLIC BLOOD PRESSURE: 84 MMHG | SYSTOLIC BLOOD PRESSURE: 136 MMHG | BODY MASS INDEX: 31.26 KG/M2 | TEMPERATURE: 97.1 F

## 2022-05-16 DIAGNOSIS — M54.16 LUMBAR RADICULOPATHY: Primary | ICD-10-CM

## 2022-05-16 DIAGNOSIS — M79.18 LEFT BUTTOCK PAIN: ICD-10-CM

## 2022-05-16 PROCEDURE — 99213 OFFICE O/P EST LOW 20 MIN: CPT | Performed by: FAMILY MEDICINE

## 2022-05-16 NOTE — NURSING NOTE
Jovon Loya is her for consult for back pain. Still having tingling below his knee. Would like possible MRI, increase medications.    Questioned patient about current smoking habits.  Pt. has never smoked.  PULSE regular  My Chart: active  CLASSIFICATION OF OVERWEIGHT AND OBESITY BY BMI                        Obesity Class           BMI(kg/m2)  Underweight                                    < 18.5  Normal                                         18.5-24.9  Overweight                                     25.0-29.9  OBESITY                     I                  30.0-34.9                             II                 35.0-39.9  EXTREME OBESITY             III                >40                            Patient's  BMI Body mass index is 28.95 kg/m .  http://hin.nhlbi.nih.gov/menuplanner/menu.cgi  Pre-visit planning  Immunizations - up to date  Colonoscopy - is up to date  Mammogram -   Asthma -   PHQ9 -    KARMA-7 -

## 2022-05-16 NOTE — PROGRESS NOTES
Assessment & Plan     Lumbar radiculopathy  Pt still bothers almost constantly from symptoms despite PT and current meds-has seen Dr salazar and [facility] in past- recommend he try increasing gabapentin a bit more- can titrate to 66 mg am, 900 mg midday, 600 mg pm    See spine if not better-can try new [facility] doc    Left buttock pain  As above      Prescription drug management         Regular exercise    No follow-ups on file.    Isak Granados MD  [facility]    Suzanna Linda is a 52 year old who presents for the following health issues     HPI     Chronic/Recurring Back Pain Follow Up    Had S1 microdiscectomy 12/21, hemilaminectomy      Where is your back pain located? (Select all that apply) low back left    How would you describe your back pain?  dull ache and gnawing    Where does your back pain spread? the left buttock, some tingling down leg    Since your last clinic visit for back pain, how has your pain changed? unchanged    Does your back pain interfere with your job? YES, needs to get up every 30 minutes    Since your last visit, have you tried any new treatment? No     Pt still doing PT twice weekly with [facility]    Pt ices before bed, has to lay on right side    How many servings of fruits and vegetables do you eat daily?  2-3    On average, how many sweetened beverages do you drink each day (Examples: soda, juice, sweet tea, etc.  Do NOT count diet or artificially sweetened beverages)?   1    How many days per week do you exercise enough to make your heart beat faster? 3 or less    How many minutes a day do you exercise enough to make your heart beat faster? 30 - 60    How many days per week do you miss taking your medication? 0        Review of Systems   Constitutional, HEENT, cardiovascular, pulmonary, gi and gu systems are negative, except as otherwise noted.      Objective    /84 (BP Location: Right arm, Patient Position: Chair, Cuff Size: Adult Large)   Pulse 84    "Temp 97.1  F (36.2  C)   Resp 20   Ht 2.057 m (6' 9\")   Wt 122.6 kg (270 lb 3.2 oz)   BMI 28.95 kg/m    Body mass index is 28.95 kg/m .  Physical Exam   GENERAL: healthy, alert and no distress  RESP: lungs clear to auscultation - no rales, rhonchi or wheezes  CV: regular rate and rhythm, normal S1 S2, no S3 or S4, no murmur, click or rub, no peripheral edema and peripheral pulses strong  MS: no gross musculoskeletal defects noted, no edema  MS: decreased lumbar ROM,                   "

## 2022-07-25 ENCOUNTER — TRANSFERRED RECORDS (OUTPATIENT)
Dept: HEALTH INFORMATION MANAGEMENT | Facility: CLINIC | Age: 52
End: 2022-07-25

## 2022-08-02 ENCOUNTER — TRANSFERRED RECORDS (OUTPATIENT)
Dept: HEALTH INFORMATION MANAGEMENT | Facility: CLINIC | Age: 52
End: 2022-08-02

## 2022-08-08 ENCOUNTER — TRANSFERRED RECORDS (OUTPATIENT)
Dept: FAMILY MEDICINE | Facility: CLINIC | Age: 52
End: 2022-08-08

## 2022-10-13 ENCOUNTER — OFFICE VISIT (OUTPATIENT)
Dept: FAMILY MEDICINE | Facility: CLINIC | Age: 52
End: 2022-10-13

## 2022-10-13 VITALS
BODY MASS INDEX: 29.83 KG/M2 | DIASTOLIC BLOOD PRESSURE: 62 MMHG | RESPIRATION RATE: 16 BRPM | OXYGEN SATURATION: 98 % | HEART RATE: 86 BPM | SYSTOLIC BLOOD PRESSURE: 122 MMHG | WEIGHT: 257.8 LBS | HEIGHT: 78 IN | TEMPERATURE: 97.9 F

## 2022-10-13 DIAGNOSIS — Z00.00 ROUTINE GENERAL MEDICAL EXAMINATION AT A HEALTH CARE FACILITY: Primary | ICD-10-CM

## 2022-10-13 DIAGNOSIS — M54.16 LUMBAR RADICULOPATHY: ICD-10-CM

## 2022-10-13 DIAGNOSIS — M51.369 DDD (DEGENERATIVE DISC DISEASE), LUMBAR: ICD-10-CM

## 2022-10-13 DIAGNOSIS — M79.18 LEFT BUTTOCK PAIN: ICD-10-CM

## 2022-10-13 DIAGNOSIS — M96.1 POST LAMINECTOMY SYNDROME: ICD-10-CM

## 2022-10-13 DIAGNOSIS — Z12.5 SPECIAL SCREENING FOR MALIGNANT NEOPLASM OF PROSTATE: ICD-10-CM

## 2022-10-13 DIAGNOSIS — Z11.59 SCREENING FOR VIRAL DISEASE: ICD-10-CM

## 2022-10-13 DIAGNOSIS — E78.2 MIXED HYPERLIPIDEMIA: ICD-10-CM

## 2022-10-13 LAB
ALBUMIN SERPL-MCNC: 4.7 G/DL (ref 3.6–5.1)
ALBUMIN/GLOB SERPL: 2 {RATIO} (ref 1–2.5)
ALP SERPL-CCNC: 46 U/L (ref 33–130)
ALT 1742-6: 35 U/L (ref 0–32)
AST 1920-8: 19 U/L (ref 0–35)
BILIRUB SERPL-MCNC: 1 MG/DL (ref 0.2–1.2)
BUN SERPL-MCNC: 12 MG/DL (ref 7–25)
BUN/CREATININE RATIO: 10.5 (ref 6–22)
CALCIUM SERPL-MCNC: 9.8 MG/DL (ref 8.6–10.3)
CHLORIDE SERPLBLD-SCNC: 106.5 MMOL/L (ref 98–110)
CHOLEST SERPL-MCNC: 227 MG/DL (ref 0–199)
CHOLEST/HDLC SERPL: 5 {RATIO} (ref 0–5)
CO2 SERPL-SCNC: 25.3 MMOL/L (ref 20–32)
CREAT SERPL-MCNC: 1.14 MG/DL (ref 0.6–1.3)
GLOBULIN, CALCULATED - QUEST: 2.4 (ref 1.9–3.7)
GLUCOSE SERPL-MCNC: 110 MG/DL (ref 60–99)
HDLC SERPL-MCNC: 50 MG/DL (ref 40–150)
LDLC SERPL CALC-MCNC: 148 MG/DL (ref 0–130)
POTASSIUM SERPL-SCNC: 4.44 MMOL/L (ref 3.5–5.3)
PROT SERPL-MCNC: 7.1 G/DL (ref 6.1–8.1)
SODIUM SERPL-SCNC: 139.3 MMOL/L (ref 135–146)
TRIGL SERPL-MCNC: 143 MG/DL (ref 0–149)

## 2022-10-13 PROCEDURE — 80053 COMPREHEN METABOLIC PANEL: CPT | Performed by: FAMILY MEDICINE

## 2022-10-13 PROCEDURE — 80061 LIPID PANEL: CPT | Performed by: FAMILY MEDICINE

## 2022-10-13 PROCEDURE — 90471 IMMUNIZATION ADMIN: CPT | Performed by: FAMILY MEDICINE

## 2022-10-13 PROCEDURE — 36415 COLL VENOUS BLD VENIPUNCTURE: CPT | Performed by: FAMILY MEDICINE

## 2022-10-13 PROCEDURE — 90686 IIV4 VACC NO PRSV 0.5 ML IM: CPT | Performed by: FAMILY MEDICINE

## 2022-10-13 PROCEDURE — 99396 PREV VISIT EST AGE 40-64: CPT | Mod: 25 | Performed by: FAMILY MEDICINE

## 2022-10-13 RX ORDER — GABAPENTIN 300 MG/1
300 CAPSULE ORAL 4 TIMES DAILY
Qty: 360 CAPSULE | Refills: 1 | Status: SHIPPED | OUTPATIENT
Start: 2022-10-13 | End: 2023-03-06

## 2022-10-13 RX ORDER — CYCLOBENZAPRINE HCL 10 MG
10 TABLET ORAL 2 TIMES DAILY PRN
Qty: 40 TABLET | Refills: 0 | Status: SHIPPED | OUTPATIENT
Start: 2022-10-13 | End: 2022-11-08

## 2022-10-13 NOTE — PROGRESS NOTES
3  SUBJECTIVE:   CC: Jovon Loya is an 52 year old male who presents for preventive health visit.       Patient has been advised of split billing requirements and indicates understanding: Yes  Healthy Habits:    Do you get at least three servings of calcium containing foods daily (dairy, green leafy vegetables, etc.)? yes    Amount of exercise or daily activities, outside of work: 4 day(s) per week    Problems taking medications regularly No    Medication side effects: No    Have you had an eye exam in the past two years? yes    Do you see a dentist twice per year? yes    Do you have sleep apnea, excessive snoring or daytime drowsiness?no      Pt weaned off gabapentin after a successful injection in back, now using 2 pills per day, uses occasional flexeril    Today's PHQ-2 Score:   PHQ-2 ( 1999 [employer]) 3/22/2022 4/12/2021   Q1: Little interest or pleasure in doing things 0 0   Q2: Feeling down, depressed or hopeless 0 0   PHQ-2 Score 0 0   PHQ-2 Total Score (12-17 Years)- Positive if 3 or more points; Administer PHQ-A if positive - 0       Abuse: Current or Past(Physical, Sexual or Emotional)- No  Do you feel safe in your environment? Yes        Social History     Tobacco Use     Smoking status: Never     Smokeless tobacco: Never   Substance Use Topics     Alcohol use: No     If you drink alcohol do you typically have >3 drinks per day or >7 drinks per week? No                      Last PSA:   [facility]ott PSA   Date Value Ref Range Status   08/20/2019 1.2 < OR = 4.0 ng/mL Final     Comment:     The total PSA value from this assay system is   standardized against the WHO standard. The test   result will be approximately 20% lower when compared   to the equimolar-standardized total PSA (Annemarie   Jesus). Comparison of serial PSA results should be   interpreted with this fact in mind.     This test was performed using the Siemens   chemiluminescent method. Values obtained from   different assay methods cannot be  used  interchangeably. PSA levels, regardless of  value, should not be interpreted as absolute  evidence of the presence or absence of disease.         Reviewed orders with patient. Reviewed health maintenance and updated orders accordingly - Yes  BP Readings from Last 3 Encounters:   10/13/22 122/62   22 136/84   22 122/88    Wt Readings from Last 3 Encounters:   10/13/22 116.9 kg (257 lb 12.8 oz)   22 122.6 kg (270 lb 3.2 oz)   22 118.1 kg (260 lb 6.4 oz)                  Patient Active Problem List   Diagnosis     Displacement of lumbar intervertebral disc without myelopathy     FAM HX-CARDIOVAS DIS [facility]     Health Care Home     Family history of malignant neoplasm of prostate     Mixed hyperlipidemia     Abnormal glucose     ACP (advance care planning)     Past Surgical History:   Procedure Laterality Date     ZZC GUERRA W/O FACETEC FORAMOT/[facility]KC 1/2 VRT SEG, LUMBAR  97     ZZC GUERRA W/O FACETEC FORAMOT/[facility] 1/2 VRT SEG, LUMBAR  98       Social History     Tobacco Use     Smoking status: Never     Smokeless tobacco: Never   Substance Use Topics     Alcohol use: No     Family History   Problem Relation Age of Onset     Alcohol/Drug Mother      Heart Disease Father          44 MI     Lipids Maternal Grandmother      Prostate Cancer Maternal Grandfather      Cardiovascular Brother         MVR     Cancer No family hx of      Diabetes No family hx of      Cancer - colorectal No family hx of      Cerebrovascular Disease No family hx of          Current Outpatient Medications   Medication Sig Dispense Refill     acetaminophen (TYLENOL) 500 MG tablet Take 500-1,000 mg by mouth every 6 hours as needed for mild pain       cyclobenzaprine (FLEXERIL) 10 MG tablet Take 1 tablet (10 mg) by mouth 2 times daily as needed 40 tablet 0     gabapentin (NEURONTIN) 300 MG capsule Take 1 capsule (300 mg) by mouth 4 times daily 360 capsule 1     Allergies   Allergen Reactions     Morphine      NAUSEA     Recent  "Labs   Lab Test 12/02/21  0000 08/20/19  0000 09/13/16  1109   LDL  --  130 142*   HDL  --  53 45   TRIG  --  127 107   ALT  --   --  25   CR 1.07 1.21* 1.12   GFRESTIMATED  --   --  78   POTASSIUM 4.64 4.57 4.3        Reviewed and updated as needed this visit by clinical staff   Tobacco  Allergies  Meds              Reviewed and updated as needed this visit by Provider                 Past Medical History:   Diagnosis Date     Displacement of lumbar intervertebral disc without myelopathy      FAMILY HX-CARDIOVAS DIS NEC      FAMILY HX-CARDIOVAS DIS NEC     Father had MI at age 44     LUMBAR DISC DISPLACEMENT      Mixed hyperlipidemia 8/10/2001     Pityriasis versicolor 5/28/2004      Past Surgical History:   Procedure Laterality Date     ZZC GUERRA W/O FACETEC FORAMOT/[facility]KC 1/2 VRT SEG, LUMBAR  97     ZZC GUERRA W/O FACETEC FORAMOT/[facility] 1/2 VRT SEG, LUMBAR  98       ROS:  CONSTITUTIONAL: NEGATIVE for fever, chills, change in weight  INTEGUMENTARY/SKIN: NEGATIVE for worrisome rashes, moles or lesions  EYES: NEGATIVE for vision changes or irritation  ENT: NEGATIVE for ear, mouth and throat problems  RESP: NEGATIVE for significant cough or SOB  CV: NEGATIVE for chest pain, palpitations or peripheral edema  GI: NEGATIVE for nausea, abdominal pain, heartburn, or change in bowel habits   male: negative for dysuria, hematuria, decreased urinary stream, erectile dysfunction, urethral discharge  MUSCULOSKELETAL: NEGATIVE for significant arthralgias or myalgia  NEURO: NEGATIVE for weakness, dizziness or paresthesias  ENDOCRINE: NEGATIVE for temperature intolerance, skin/hair changes  PSYCHIATRIC: NEGATIVE for changes in mood or affect    OBJECTIVE:   /62 (BP Location: Right arm, Patient Position: Sitting, Cuff Size: Adult Large)   Pulse 86   Temp 97.9  F (36.6  C) (Tympanic)   Resp 16   Ht 2.057 m (6' 9\")   Wt 116.9 kg (257 lb 12.8 oz)   SpO2 98%   BMI 27.63 kg/m    EXAM:  GENERAL: healthy, alert and no " distress  EYES: Eyes grossly normal to inspection, PERRL and conjunctivae and sclerae normal  HENT: ear canals and TM's normal, nose and mouth without ulcers or lesions  NECK: no adenopathy, no asymmetry, masses, or scars and thyroid normal to palpation  RESP: lungs clear to auscultation - no rales, rhonchi or wheezes  CV: regular rate and rhythm, normal S1 S2, no S3 or S4, no murmur, click or rub, no peripheral edema and peripheral pulses strong  ABDOMEN: soft, nontender, no hepatosplenomegaly, no masses and bowel sounds normal   (male): normal male genitalia without lesions or urethral discharge, no hernia  MS: no gross musculoskeletal defects noted, no edema  SKIN: no suspicious lesions or rashes  NEURO: Normal strength and tone, mentation intact and speech normal  PSYCH: mentation appears normal, affect normal/bright    Diagnostic Test Results:  Labs reviewed in Epic    ASSESSMENT/PLAN:   (Z00.00) Routine general medical examination at a health care facility  (primary encounter diagnosis)  Comment: discussed preventitive healthcare   Plan: Continue to work on healthy diet and exercise, discussed healthy habits     (M51.36) DDD (degenerative disc disease), lumbar  Comment: seeing spine, may need ablation  Plan: continue current medications at current doses     (M96.1) Post laminectomy syndrome  Comment:   Plan: gabapentin (NEURONTIN) 300 MG capsule,         cyclobenzaprine (FLEXERIL) 10 MG tablet        continue current medications at current doses     (E78.2) Mixed hyperlipidemia  Comment: control uncertain  Plan: Lipid Panel ([facility]), Comprehensive Metobolic         Panel ([facility]), VENOUS COLLECTION        Continue to work on healthy diet and exercise, discussed healthy habits     (Z12.5) Special screening for malignant neoplasm of prostate  Comment:   Plan: PSA Total ([facility]), VENOUS COLLECTION            (Z11.59) Screening for viral disease  Comment:   Plan: HIV 1/2 Agn Pamela 4th Gen w Reflex ([facility]),          "HEPATITIS C ANTIBODY ([facility]), VENOUS COLLECTION            (M54.16) Lumbar radiculopathy  Comment:   Plan: gabapentin (NEURONTIN) 300 MG capsule,         cyclobenzaprine (FLEXERIL) 10 MG tablet            (M79.18) Left buttock pain  Comment:   Plan: cyclobenzaprine (FLEXERIL) 10 MG tablet              Patient has been advised of split billing requirements and indicates understanding: Yes  COUNSELING:  Reviewed preventive health counseling, as reflected in patient instructions       Regular exercise       Healthy diet/nutrition       Vision screening       Immunizations    Vaccinated for: Influenza             Colorectal cancer screening       Prostate cancer screening    Estimated body mass index is 27.63 kg/m  as calculated from the following:    Height as of this encounter: 2.057 m (6' 9\").    Weight as of this encounter: 116.9 kg (257 lb 12.8 oz).    Weight management plan: Discussed healthy diet and exercise guidelines    He reports that he has never smoked. He has never used smokeless tobacco.      Counseling Resources:  ATP IV Guidelines  Pooled Cohorts Equation Calculator  FRAX Risk Assessment  ICSI Preventive Guidelines  Dietary Guidelines for Americans, 2010  USDA's MyPlate  ASA Prophylaxis  Lung CA Screening    Isak Granados MD  [facility]  "

## 2022-10-13 NOTE — NURSING NOTE
Chief Complaint   Patient presents with     Physical     Physical exam      Recheck Medication     Med check -    Pre-visit Screening:  Immunizations:  up to date  Colonoscopy:  October 2019  Mammogram: N/A  Asthma Action Test/Plan: N/A  PHQ9: N/A  GAD7: N/A  Questioned patient about current smoking habits Pt. has never smoked.  Ok to leave detailed message on voice mail for today's visit only Yes, phone #   [phone]

## 2022-10-14 LAB
ABBOTT PSA - QUEST: 1.13 NG/ML
HCV AB - QUEST: NORMAL
HIV 1/2 AGN ABY 4TH GEN WITH REFLEX: NORMAL
SIGNAL TO CUT OFF - QUEST: 0.07

## 2022-11-08 ENCOUNTER — OFFICE VISIT (OUTPATIENT)
Dept: FAMILY MEDICINE | Facility: CLINIC | Age: 52
End: 2022-11-08

## 2022-11-08 VITALS
DIASTOLIC BLOOD PRESSURE: 88 MMHG | HEART RATE: 84 BPM | BODY MASS INDEX: 27.86 KG/M2 | RESPIRATION RATE: 22 BRPM | WEIGHT: 260 LBS | OXYGEN SATURATION: 99 % | SYSTOLIC BLOOD PRESSURE: 130 MMHG | TEMPERATURE: 97.6 F

## 2022-11-08 DIAGNOSIS — G89.29 CHRONIC MIDLINE LOW BACK PAIN WITH LEFT-SIDED SCIATICA: Primary | ICD-10-CM

## 2022-11-08 DIAGNOSIS — M96.1 POST LAMINECTOMY SYNDROME: ICD-10-CM

## 2022-11-08 DIAGNOSIS — M79.18 LEFT BUTTOCK PAIN: ICD-10-CM

## 2022-11-08 DIAGNOSIS — M54.42 CHRONIC MIDLINE LOW BACK PAIN WITH LEFT-SIDED SCIATICA: Primary | ICD-10-CM

## 2022-11-08 DIAGNOSIS — M54.16 LUMBAR RADICULOPATHY: ICD-10-CM

## 2022-11-08 PROCEDURE — 72100 X-RAY EXAM L-S SPINE 2/3 VWS: CPT | Performed by: STUDENT IN AN ORGANIZED HEALTH CARE EDUCATION/TRAINING PROGRAM

## 2022-11-08 PROCEDURE — 99214 OFFICE O/P EST MOD 30 MIN: CPT | Performed by: STUDENT IN AN ORGANIZED HEALTH CARE EDUCATION/TRAINING PROGRAM

## 2022-11-08 RX ORDER — PREDNISONE 20 MG/1
40 TABLET ORAL
Qty: 10 TABLET | Refills: 0 | Status: SHIPPED | OUTPATIENT
Start: 2022-11-08 | End: 2022-11-13

## 2022-11-08 RX ORDER — PREDNISONE 20 MG/1
TABLET ORAL
COMMUNITY
Start: 2022-11-04 | End: 2022-11-08

## 2022-11-08 RX ORDER — CYCLOBENZAPRINE HCL 10 MG
10 TABLET ORAL
Qty: 90 TABLET | Refills: 1 | Status: SHIPPED | OUTPATIENT
Start: 2022-11-08 | End: 2023-11-27

## 2022-11-08 NOTE — PROGRESS NOTES
ASSESSMENT & PLAN      ICD-10-CM    1. Chronic midline low back pain with left-sided sciatica  M54.42 XR Lumbar Spine 2/3 Views    G89.29 Orthopedic  Referral     predniSONE (DELTASONE) 20 MG tablet      2. Post laminectomy syndrome  M96.1 cyclobenzaprine (FLEXERIL) 10 MG tablet     Orthopedic  Referral      3. Lumbar radiculopathy  M54.16 cyclobenzaprine (FLEXERIL) 10 MG tablet     Orthopedic  Referral      4. Left buttock pain  M79.18 cyclobenzaprine (FLEXERIL) 10 MG tablet         Hx reviewed, sx seem to correlate to L5 level. No red flags. Recommend PT and sx mgmt below in addition to spine follow-up for injection consideration.     Patient Instructions   Try I gabapentin during morning and afternoon as well as bedtime dose    Continue flexaril as needed     Tylenol 1000mg 3x/day    Consider lidocaine patch (OTC)    Call tomorrow to schedule with Dr. Ferny Donnelly  [facility]  [address] [facility] [address]  [facility] [address]  [phone] -- appt line        >30 minutes spent on the date of the encounter doing chart review, history and exam, documentation and further activities per the note.    Charles Cervantes MD, [facility]      -----    SUBJECTIVE  Jovon Loya is a/an 52 year old male who is seen for evaluation of     Chief Complaint   Patient presents with     Back Pain     Having some lower back sciatica pain, pain starting to go into the left leg, went to urgent care Friday morning for this and was given 20 mg prednisone for 5 days, did not have xrays done so will do today     The patient is seen by themselves.    Ongoing intermittent lumbar issues since 1990s hx L3-4 and L4-5 (x2) microdiscectomy surgeries, revision L4-5 decompression 2021. Injections at at [facility] in August 2022 were really helpful. Has also been seen at  Pain clinic.   Mechanism of injury: Reports insidious onset without acute precipitating event.  Location of  Pain: left lateral buttock, thigh and lower leg down to lateral foot  Better with: unsure  Treatments tried: PT ongoing for ~18 months  Associated symptoms: no focal weakness, f/c or bowel/bladder changes; denies swelling or warmth      Patient's PMH, PSH, and family hx reviewed.      OBJECTIVE:  /88 (BP Location: Left arm, Patient Position: Sitting, Cuff Size: Adult Large)   Pulse 84   Temp 97.6  F (36.4  C) (Temporal)   Resp 22   Wt 117.9 kg (260 lb)   SpO2 99%   BMI 27.86 kg/m     Alert, NAD  NC/AT  Sclerae anicteric  Regular  Resp nonlabored  Skin warm and dry   Speech intact.   Appropriate affect    Lumbar ROM limited by pain  Left parasp spasm and mild ttp, no bony midline ttp  5/5 str BLE, SILT BLE  Distal pulses intact      RADIOLOGY:  Results for orders placed or performed in visit on 11/08/22   XR Lumbar Spine 2/3 Views     Status: None    Narrative    Radiologist Consultation/:   Fax:  [phone]  _____________________________________________________________________________________________________________________________________________________________________________________________________________________________________________________________________________________________________________________________________________________________________________________________________________________________________________________________________________________________________  PATIENT NAME: GOMEZ DODATE: 1970 Age: 52 ACCESSION NUMBER: [id number]  SEX: M ORDERING PROVIDER: Charles Cervantes  FACILITY: [facility] PRIMARY PROVIDER:  PATIENT ID: [id number] INTERESTED PARTY:  Page 1 of  1  _________________________________________________________________________________________________________________________________________________________________________________________________________________________________________________________________________________________________________________________________________________________________________________________  EXAM: XRAY LUMBAR SPINE-2 OR 3 VIEW  LOCATION: [facility]  DATE/TIME: 11/8/2022 10:50 AM  INDICATION: Low back pain with left sciatica  COMPARISON: Lumbar spine MRI dated 08/02/2022  TECHNIQUE: CR Lumbar Spine.  IMPRESSION: Mild dextroconvex curvature of the lumbar spine without definite spondylolisthesis. No acute  fracture. Scattered multilevel degenerative change most prominent L5-S1 where there is mild disc height loss.  Pelvic phleboliths.  SIGNED BY: Balwinder Maravilla MD 11/9/2022 8:57 AM

## 2022-11-08 NOTE — NURSING NOTE
Chief Complaint   Patient presents with     Back Pain     Having some lower back sciatica pain, pain starting to go into the left leg, went to urgent care Friday morning for this and was given 20 mg prednisone for 5 days, did not have xrays done so will do today     Pre-visit Screening:  Immunizations:  up to date  Colonoscopy:  is up to date  Mammogram: NA  Asthma Action Test/Plan:  NA  PHQ9:  NA  GAD7:  NA  Questioned patient about current smoking habits Pt. has never smoked.  Ok to leave detailed message on voice mail for today's visit only Yes, phone # [phone]

## 2022-11-08 NOTE — PATIENT INSTRUCTIONS
Try I gabapentin during morning and afternoon as well as bedtime dose    Continue flexaril as needed     Tylenol 1000mg 3x/day    Consider lidocaine patch (OTC)    Call tomorrow to schedule with Dr. Ferny Donnelly  [facility]  [address]   [facility] [address]  [facility] [address]  [phone] -- appt line

## 2022-11-10 ENCOUNTER — TELEPHONE (OUTPATIENT)
Dept: FAMILY MEDICINE | Facility: CLINIC | Age: 52
End: 2022-11-10

## 2022-11-10 NOTE — TELEPHONE ENCOUNTER
I left a message for patient to call me back regarding referral to Dr. Ferny Donnelly with [facility] --     Referral was sent to     Dr. Feryn Donnelly   [facility]  [address] [place] [address] [address]   [facility] [address]  [facility] [address]  [phone] -- appt line  [phone] -- fax

## 2022-11-18 ENCOUNTER — TRANSFERRED RECORDS (OUTPATIENT)
Dept: HEALTH INFORMATION MANAGEMENT | Facility: CLINIC | Age: 52
End: 2022-11-18

## 2022-11-22 ENCOUNTER — TRANSFERRED RECORDS (OUTPATIENT)
Dept: FAMILY MEDICINE | Facility: CLINIC | Age: 52
End: 2022-11-22

## 2022-11-23 ENCOUNTER — TELEPHONE (OUTPATIENT)
Dept: FAMILY MEDICINE | Facility: CLINIC | Age: 52
End: 2022-11-23

## 2022-11-23 NOTE — TELEPHONE ENCOUNTER
Patient called asking for updated Referral to [facility] for 2023 & a referral to [facility] for his upcoming injection that Dr. Donnelly with [facility] is ordering.  On line referrals to [facility] have bene issued     [facility]   Referral # [id number]  Start Date: 01/01/2023  End Date: 12/31/2023   Visits: 99    [facility]   Referral # [id number]  Start Date: 11/23/2022   End Date: 12/31/2022  Visits: 2

## 2022-11-29 ENCOUNTER — TRANSFERRED RECORDS (OUTPATIENT)
Dept: FAMILY MEDICINE | Facility: CLINIC | Age: 52
End: 2022-11-29

## 2022-12-13 ENCOUNTER — TRANSFERRED RECORDS (OUTPATIENT)
Dept: HEALTH INFORMATION MANAGEMENT | Facility: CLINIC | Age: 52
End: 2022-12-13

## 2022-12-27 ENCOUNTER — TRANSFERRED RECORDS (OUTPATIENT)
Dept: HEALTH INFORMATION MANAGEMENT | Facility: CLINIC | Age: 52
End: 2022-12-27

## 2022-12-29 ENCOUNTER — TELEPHONE (OUTPATIENT)
Dept: NEUROSURGERY | Facility: CLINIC | Age: 52
End: 2022-12-29

## 2022-12-29 NOTE — TELEPHONE ENCOUNTER
[facility] Call Center    Phone Message    May a detailed message be left on voicemail: yes     Reason for Call: Appointment Intake    Referring Provider Name: none self referred  Diagnosis and/or Symptoms: Patient wants to see Dr. Mijares for a 2nd opinion. Had recent imaging done at [facility].     Action Taken: Other: Neurosurgery    Travel Screening: Not Applicable

## 2023-01-02 NOTE — TELEPHONE ENCOUNTER
Action 1/2/23 MV 11.05am   Action Taken 1) records + imaging request faxed to [facility]  2) imaging request faxed to [facility]  3) called pt to see where he had surgeries done - pt stated:  1997 -  [facility]  1998 - [facility]  2020 - [facility]  2021 - [facility] Dr Ronnie Zhang  4) called [facility] to obtain records however office is closed due to floating holiday - call back tomorrow  5) faxed request to [facility] for 97 and 98 surgery op notes    1/4/23 MV 2.32pm  Called [facility] to fax over records and op note    1/9/23 MV 7.44am  Recs rec'd from [facility] and sent to scanning - NO OP REPORT FROM 2020 1/9/23 MV 10.15am  1) Called [facility] to follow up on records since we did not receive anything - records will be faxed over shortly  2) Called [facility] to get status on OP reports - HIM ordered paper chart   UPDATE: [facility] records rec'd and sent to scanning    1/10/23 MV 2.08pm  Called [facility] to obtain June 2020 op report (was not faxed with the initial batch of records received).   UPDATE: OP report rec'd and sent to scanning    STILL WAITING FOR [facility] OP REPORTS - tried calling HIM but too long of a wait time. Will call back tomorrow. MV 2.55pm    1/11/23 MV 12.22pm  Called [facility] - paper chart still processing.     1/17/23 MV 2.30pm  Op reports rec'd and sent to scanning         SPINE PATIENTS - NEW PROTOCOL PREVISIT    RECORDS RECEIVED FROM: self   REASON FOR VISIT: 2nd opinion lumbar spine   Date of Appt: 1/12/23   NOTES (FOR ALL VISITS) STATUS DETAILS   OFFICE NOTE from other specialist Received Dr Ferny Donnelly @ [facility]:  11/22/22 11/14/22    Dr Ronnie Zhang @ [facility]:  10/5/21    Dr Charles Cervantes @ [facility]:  11/8/22    Dr Jayne Villalba @ [facility]:  7/25/22    Dr Staci Akins @  [facility]:  10/25/21    Dr Jovon Morin @ [facility]:  9/25/20    Dr Major  [facility]:  9/25/20 9/4/20 7/16/20  (additional encounters in media tab)    Dr Kwabena Gupta @ [facility]:  6/18/20 5/14/20   DISCHARGE REPORT from ER Received [facility]:  L3-4 microdisectomy 1997    [facility]:  L4-5 microdisectomy 1998    [facility]:  L5-S1 microdisectomy 6/5/20    [facility]:  Left lumbar 5 sacral 1 Revision microdiscectomy 12/13/21  Left lumbar 4 hemilaminectomy    MEDICATION LIST Internal    IMAGING  (FOR ALL VISITS)     MRI (HEAD, NECK, SPINE) Rec'd [facility] [place] [facility]:  MRI Lumbar Spine 11/18/22    [facility]:  MRI Lumbar Spine 8/2/22  MRI Lumbar Spine 9/27/21  MRI Lumbar Spine 9/2/20  MRI Lumbar Spine 2/10/20   XRAY (SPINE) *NEUROSURGERY* Internal/Rec'd [facility]:  XR Lumbar Spine 1/12/23    [facility]:  XR Lumbar Spine 7/25/22

## 2023-01-03 DIAGNOSIS — M51.26 DISPLACEMENT OF LUMBAR INTERVERTEBRAL DISC WITHOUT MYELOPATHY: Primary | ICD-10-CM

## 2023-01-05 ENCOUNTER — TRANSFERRED RECORDS (OUTPATIENT)
Dept: FAMILY MEDICINE | Facility: CLINIC | Age: 53
End: 2023-01-05

## 2023-01-05 ENCOUNTER — TELEPHONE (OUTPATIENT)
Dept: NEUROSURGERY | Facility: CLINIC | Age: 53
End: 2023-01-05

## 2023-01-10 NOTE — TELEPHONE ENCOUNTER
Writer contacted PACS for imaging to be resolved to patient MRN.     Sepideh Luz LPN  Neurosurgery

## 2023-01-11 ENCOUNTER — TRANSFERRED RECORDS (OUTPATIENT)
Dept: FAMILY MEDICINE | Facility: CLINIC | Age: 53
End: 2023-01-11

## 2023-01-12 ENCOUNTER — ANCILLARY PROCEDURE (OUTPATIENT)
Dept: GENERAL RADIOLOGY | Facility: CLINIC | Age: 53
End: 2023-01-12
Attending: NEUROLOGICAL SURGERY
Payer: COMMERCIAL

## 2023-01-12 ENCOUNTER — PRE VISIT (OUTPATIENT)
Dept: NEUROSURGERY | Facility: CLINIC | Age: 53
End: 2023-01-12

## 2023-01-12 ENCOUNTER — OFFICE VISIT (OUTPATIENT)
Dept: NEUROSURGERY | Facility: CLINIC | Age: 53
End: 2023-01-12
Payer: COMMERCIAL

## 2023-01-12 VITALS
HEART RATE: 86 BPM | SYSTOLIC BLOOD PRESSURE: 150 MMHG | BODY MASS INDEX: 31.22 KG/M2 | WEIGHT: 269.8 LBS | HEIGHT: 78 IN | DIASTOLIC BLOOD PRESSURE: 98 MMHG | OXYGEN SATURATION: 97 %

## 2023-01-12 DIAGNOSIS — G89.29 CHRONIC MIDLINE LOW BACK PAIN WITH LEFT-SIDED SCIATICA: ICD-10-CM

## 2023-01-12 DIAGNOSIS — M96.1 POST LAMINECTOMY SYNDROME: ICD-10-CM

## 2023-01-12 DIAGNOSIS — M51.26 DISPLACEMENT OF LUMBAR INTERVERTEBRAL DISC WITHOUT MYELOPATHY: ICD-10-CM

## 2023-01-12 DIAGNOSIS — M54.16 LUMBAR RADICULOPATHY: ICD-10-CM

## 2023-01-12 DIAGNOSIS — M54.42 CHRONIC MIDLINE LOW BACK PAIN WITH LEFT-SIDED SCIATICA: ICD-10-CM

## 2023-01-12 PROCEDURE — 77073 BONE LENGTH STUDIES: CPT | Performed by: SURGERY

## 2023-01-12 PROCEDURE — 72082 X-RAY EXAM ENTIRE SPI 2/3 VW: CPT | Performed by: SURGERY

## 2023-01-12 PROCEDURE — 99204 OFFICE O/P NEW MOD 45 MIN: CPT | Performed by: NEUROLOGICAL SURGERY

## 2023-01-12 ASSESSMENT — PAIN SCALES - GENERAL: PAINLEVEL: EXTREME PAIN (8)

## 2023-01-12 NOTE — LETTER
1/12/2023       RE: Jovon Loya  [id number] [facility] [id number]     Dear Colleague,    Thank you for referring your patient, Jovon Loya, to the [facility] NEUROSURGERY CLINIC [place] at [facility]. Please see a copy of my visit note below.        Neurosurgery Clinic Note    Chief Complaint: left leg pain    History of Present Illness:  It was a pleasure to evaluate Jovon Loya in clinic today at the kind referral of Isak Granados MD  [address], [id number]  [place] [address].    Jovon Loya is a 52 year old male presenting with left low back pain radiating to left leg, buttock, thigh, into dorsum of foot, numbness in foot, inability to stand more than 10 minutes. Better with GEOVANY, worse with activity.  Operative report obtained and reviewed from [facility]/Dr. Gupta 6/5/2020 for left L5-S1 microdiskectomy, midline open approach. Patient also reports prior microdiskectomy at L5-S1 before this, and also at L4-5 in 2009, and at L3-4 in 1990s  No significant right leg pain.    Past Medical History:   Diagnosis Date     Displacement of lumbar intervertebral disc without myelopathy      FAMILY HX-CARDIOVAS DIS NEC      FAMILY HX-CARDIOVAS DIS NEC     Father had MI at age 44     LUMBAR DISC DISPLACEMENT      Mixed hyperlipidemia 8/10/2001     Pityriasis versicolor 5/28/2004       Past Surgical History:   Procedure Laterality Date     ZZC GUERRA W/O FACETEC FORAMOT/[facility] 1/2 VRT SEG, LUMBAR  97     ZZC GUERRA W/O FACETEC FORAMOT/[facility]KC 1/2 VRT SEG, LUMBAR  98       Social History     Socioeconomic History     Marital status:      Spouse name: Zonia     Number of children: 1     Years of education: 18   Occupational History     Occupation: Investigator     Employer[place] [employer]     Comment[place] [employer]   Tobacco Use     Smoking status: Never     Smokeless tobacco: Never   Substance  "and Sexual Activity     Alcohol use: No     Drug use: No     Sexual activity: Yes     Partners: Female   Other Topics Concern     Exercise Yes     Seat Belt Yes     Self-Exams Yes     Parent/sibling w/ CABG, MI or angioplasty before 65F 55M? Yes       family history includes Alcohol/Drug in his mother; Cardiovascular in his brother; Heart Disease in his father; Lipids in his maternal grandmother; Prostate Cancer in his maternal grandfather.        IMAGING per my own measurement and interpretation:  Xrays:  EOS 01/12/23    Lower lumbar hypolordosis  Limb length discrepancy with more than 3 degrees pelvic obliquity, 12 degrees lumbar scoliosis    MRI lumbar with significant stenosis L3-4, recurrent left lateral recess disc herniation left L4-5              Resulted Imaging/Labs:  MRI lumbar 11/18/22 with noted perineural fibrosis around left S1 nerve root and following:      Vitamin D:  Vitamin D Deficiency Screening Results:  No results found for: VITDT  No results found for: LKS434, QERE856, [id number], VITD3, D2VIT, D3VIT, DTOT, [id number], [id number], [id number], [id number], [id number], [id number]      Nutritional Status:  Estimated body mass index is 28.91 kg/m  as calculated from the following:    Height as of this encounter: 2.057 m (6' 9\").    Weight as of this encounter: 122.4 kg (269 lb 12.8 oz).    Lab Results   Component Value Date    ALBUMIN 4.7 10/13/2022       Diabetes Screening:  No results found for: A1C    Nicotine Usage:    No                Physical Exam   BP (!) 150/98 (BP Location: Right arm, Patient Position: Sitting, Cuff Size: Adult Large)   Pulse 86   Ht 2.057 m (6' 9\")   Wt 122.4 kg (269 lb 12.8 oz)   SpO2 97%   BMI 28.91 kg/m    Constitutional: Oriented to person, place, and time. Appears well-developed and well-nourished. Cooperative. Neurological: alert and oriented to person, place, and time.   No cranial nerve deficit   sensory deficit left foot and calf  Gait antalgic    Reflex " Scores:        Tricep reflexes are 2+ on the right side and 2+ on the left side.       Bicep reflexes are 2+ on the right side and 2+ on the left side.       Brachioradialis reflexes are 2+ on the right side and 2+ on the left side.       Patellar reflexes are 2+ on the right side and 2+ on the left side.       Achilles reflexes are 2+ on the right side and 0+ on the left side.    STRENGTH LEFT RIGHT   Deltoid 5 5   Bicep 5 5   Wrist Extensor 5 5   Tricep 5 5   Finger flexion 5 5   Finger abduction 5 5    5 5       Hip Flexion     5     5   Knee Extension 5 5   Ankle Dorsiflexion 5 5   Extensor Hallucis Longus 5 5   Plantar Flexion 5 5   Foot eversion 5 5   Foot inversion 5 5             Skin: Skin is warm, dry and intact.   Psychiatric: Normal mood and affect. Speech is normal and behavior is normal.        ASSESSMENT:  Jovon Loya is a 52 year old male with lower lumbar hypolordosis with compensatory upper lumbar hyperextension, history of prior L3-4, L4-5, L5-S1 microdiskectomies with recurrent stenosis L3-4, L4-5    PLAN:    L3-4 facet arthropathy, significant central and lateral recess stenosis; recurrent L4-5 disc herniation in left lateral recess, loss of disc height L3-4, L4-5, lower lumbar hypolordosis    recommmended L3-4, L4-5 TLIF and posterior fusion with Schwab grade 2 osteotomies for local deformity correction and decompression of central stenosis L3-4 and left lateral recess stenosis revision decompression at left L4-5    Another revision microdiskectomy is also possible, but based on hypolordosis and facet arthropathy, in my opinion this will not be a lasting solution for him.  I showed the patient pictures of his spine and provided him risks/benefits/alternatives for surgery.    He will let us know if he wants to proceed.  Explained limb length discrepancy driving scoliosis and recommended left shoe lift.      1/12/2023      I spent 45 minutes in patient care 25 minutes face to face time  and 20 minutes imaging and prior records review and formation of surgical planning.          Again, thank you for allowing me to participate in the care of your patient.      Sincerely,    Jaymie Mijares MD

## 2023-01-12 NOTE — PATIENT INSTRUCTIONS
Dr Mijares has recommended surgery for an L3-4, L4-5 decompression and transforaminal interbody fusion. If you have questions or want to proceed with surgery, please call Dr Dyllan Stark' RN Care Coordinator at [phone].

## 2023-01-12 NOTE — PROGRESS NOTES
Neurosurgery Clinic Note    Chief Complaint: left leg pain    History of Present Illness:  It was a pleasure to evaluate Jovon Loya in clinic today at the kind referral of Isak Granados MD  [address], [id number]  [place] [address].    Jovon Loya is a 52 year old male presenting with left low back pain radiating to left leg, buttock, thigh, into dorsum of foot, numbness in foot, inability to stand more than 10 minutes. Better with GEOVANY, worse with activity.  Operative report obtained and reviewed from [facility]/Dr. Gupta 6/5/2020 for left L5-S1 microdiskectomy, midline open approach. Patient also reports prior microdiskectomy at L5-S1 before this, and also at L4-5 in 2009, and at L3-4 in 1990s  No significant right leg pain.    Past Medical History:   Diagnosis Date     Displacement of lumbar intervertebral disc without myelopathy      FAMILY HX-CARDIOVAS DIS NEC      FAMILY HX-CARDIOVAS DIS NEC     Father had MI at age 44     LUMBAR DISC DISPLACEMENT      Mixed hyperlipidemia 8/10/2001     Pityriasis versicolor 5/28/2004       Past Surgical History:   Procedure Laterality Date     ZZC GUERRA W/O FACETEC FORAMOT/[facility]KC 1/2 VRT SEG, LUMBAR  97     ZZC GUERRA W/O FACETEC FORAMOT/[facility]KC 1/2 VRT SEG, LUMBAR  98       Social History     Socioeconomic History     Marital status:      Spouse name: Zonia     Number of children: 1     Years of education: 18   Occupational History     Occupation: Investigator     Employer: [employer]     Comment[place] [employer]   Tobacco Use     Smoking status: Never     Smokeless tobacco: Never   Substance and Sexual Activity     Alcohol use: No     Drug use: No     Sexual activity: Yes     Partners: Female   Other Topics Concern     Exercise Yes     Seat Belt Yes     Self-Exams Yes     Parent/sibling w/ CABG, MI or angioplasty before 65F 55M? Yes       family history includes Alcohol/Drug in his mother; Cardiovascular in his  "brother; Heart Disease in his father; Lipids in his maternal grandmother; Prostate Cancer in his maternal grandfather.        IMAGING per my own measurement and interpretation:  Xrays:  EOS 01/12/23    Lower lumbar hypolordosis  Limb length discrepancy with more than 3 degrees pelvic obliquity, 12 degrees lumbar scoliosis    MRI lumbar with significant stenosis L3-4, recurrent left lateral recess disc herniation left L4-5              Resulted Imaging/Labs:  MRI lumbar 11/18/22 with noted perineural fibrosis around left S1 nerve root and following:      Vitamin D:  Vitamin D Deficiency Screening Results:  No results found for: VITDT  No results found for: WPU169, CSRZ079, [id number], VITD3, D2VIT, D3VIT, DTOT, [id number], [id number], [id number], [id number], [id number], [id number]      Nutritional Status:  Estimated body mass index is 28.91 kg/m  as calculated from the following:    Height as of this encounter: 2.057 m (6' 9\").    Weight as of this encounter: 122.4 kg (269 lb 12.8 oz).    Lab Results   Component Value Date    ALBUMIN 4.7 10/13/2022       Diabetes Screening:  No results found for: A1C    Nicotine Usage:    No                Physical Exam   BP (!) 150/98 (BP Location: Right arm, Patient Position: Sitting, Cuff Size: Adult Large)   Pulse 86   Ht 2.057 m (6' 9\")   Wt 122.4 kg (269 lb 12.8 oz)   SpO2 97%   BMI 28.91 kg/m    Constitutional: Oriented to person, place, and time. Appears well-developed and well-nourished. Cooperative. Neurological: alert and oriented to person, place, and time.   No cranial nerve deficit   sensory deficit left foot and calf  Gait antalgic    Reflex Scores:        Tricep reflexes are 2+ on the right side and 2+ on the left side.       Bicep reflexes are 2+ on the right side and 2+ on the left side.       Brachioradialis reflexes are 2+ on the right side and 2+ on the left side.       Patellar reflexes are 2+ on the right side and 2+ on the left side.       Achilles " reflexes are 2+ on the right side and 0+ on the left side.    STRENGTH LEFT RIGHT   Deltoid 5 5   Bicep 5 5   Wrist Extensor 5 5   Tricep 5 5   Finger flexion 5 5   Finger abduction 5 5    5 5       Hip Flexion     5     5   Knee Extension 5 5   Ankle Dorsiflexion 5 5   Extensor Hallucis Longus 5 5   Plantar Flexion 5 5   Foot eversion 5 5   Foot inversion 5 5             Skin: Skin is warm, dry and intact.   Psychiatric: Normal mood and affect. Speech is normal and behavior is normal.        ASSESSMENT:  Jovon Loya is a 52 year old male with lower lumbar hypolordosis with compensatory upper lumbar hyperextension, history of prior L3-4, L4-5, L5-S1 microdiskectomies with recurrent stenosis L3-4, L4-5    PLAN:    L3-4 facet arthropathy, significant central and lateral recess stenosis; recurrent L4-5 disc herniation in left lateral recess, loss of disc height L3-4, L4-5, lower lumbar hypolordosis    recommmended L3-4, L4-5 TLIF and posterior fusion with Schwab grade 2 osteotomies for local deformity correction and decompression of central stenosis L3-4 and left lateral recess stenosis revision decompression at left L4-5    Another revision microdiskectomy is also possible, but based on hypolordosis and facet arthropathy, in my opinion this will not be a lasting solution for him.  I showed the patient pictures of his spine and provided him risks/benefits/alternatives for surgery.    He will let us know if he wants to proceed.  Explained limb length discrepancy driving scoliosis and recommended left shoe lift.    Jaymie Mijares MD    [facility] Department of Neurosurgery  Complex Spinal Deformity, Scoliosis, and Minimally Invasive Spine Surgery Specialist  Office: [phone]    1/12/2023          I spent 45 minutes in patient care 25 minutes face to face time and 20 minutes imaging and prior records review and formation of surgical planning.

## 2023-01-23 ENCOUNTER — OFFICE VISIT (OUTPATIENT)
Dept: FAMILY MEDICINE | Facility: CLINIC | Age: 53
End: 2023-01-23

## 2023-01-23 VITALS
SYSTOLIC BLOOD PRESSURE: 110 MMHG | BODY MASS INDEX: 31.1 KG/M2 | TEMPERATURE: 97.6 F | HEART RATE: 88 BPM | DIASTOLIC BLOOD PRESSURE: 84 MMHG | WEIGHT: 268.8 LBS | HEIGHT: 78 IN | RESPIRATION RATE: 20 BRPM

## 2023-01-23 DIAGNOSIS — M47.26 OSTEOARTHRITIS OF SPINE WITH RADICULOPATHY, LUMBAR REGION: ICD-10-CM

## 2023-01-23 DIAGNOSIS — E78.2 MIXED HYPERLIPIDEMIA: ICD-10-CM

## 2023-01-23 DIAGNOSIS — M96.1 POST LAMINECTOMY SYNDROME: ICD-10-CM

## 2023-01-23 DIAGNOSIS — Z01.818 PREOPERATIVE EXAMINATION: Primary | ICD-10-CM

## 2023-01-23 LAB
% GRANULOCYTES: 56.5 %
HCT VFR BLD AUTO: 46.4 % (ref 40–53)
HEMOGLOBIN: 15.3 G/DL (ref 13.3–17.7)
LYMPHOCYTES NFR BLD AUTO: 32.5 %
MCH RBC QN AUTO: 29.8 PG (ref 26–33)
MCHC RBC AUTO-ENTMCNC: 33 G/DL (ref 31–36)
MCV RBC AUTO: 90.4 FL (ref 78–100)
MONOCYTES NFR BLD AUTO: 11 %
PLATELET COUNT - QUEST: 195 10^9/L (ref 150–375)
RBC # BLD AUTO: 5.13 10*12/L (ref 4.4–5.9)
WBC # BLD AUTO: 5.9 10*9/L (ref 4–11)

## 2023-01-23 PROCEDURE — 36415 COLL VENOUS BLD VENIPUNCTURE: CPT | Performed by: FAMILY MEDICINE

## 2023-01-23 PROCEDURE — 99214 OFFICE O/P EST MOD 30 MIN: CPT | Performed by: FAMILY MEDICINE

## 2023-01-23 PROCEDURE — 85025 COMPLETE CBC W/AUTO DIFF WBC: CPT | Performed by: FAMILY MEDICINE

## 2023-01-23 NOTE — PROGRESS NOTES
[facility]  [address] [place] [address]  [address]  [facility] [address]  Phone: [phone]  Fax: [phone]  Primary Provider: Ila Granados  Pre-op Performing Provider: ILA GRANADOS      PREOPERATIVE EVALUATION:  Today's date: 1/23/2023    Jovon Loya is a 52 year old male who presents for a preoperative evaluation.    Surgical Information:  Surgery/Procedure: microdiscectomy  Surgery Location: [place]  Surgeon:  Colt Jackson MD  Surgery Date: 1/27/23  Time of Surgery: am  Where patient plans to recover: At home with family  Fax number for surgical facility: [phone]    Type of Anesthesia Anticipated: to be determined    Assessment & Plan     The proposed surgical procedure is considered INTERMEDIATE risk.    Preoperative examination      Osteoarthritis of spine with radiculopathy, lumbar region      Post laminectomy syndrome      Mixed hyperlipidemia              Risks and Recommendations:  The patient has the following additional risks and recommendations for perioperative complications:   - No identified additional risk factors other than previously addressed    Medication Instructions:  Patient is to take all scheduled medications on the day of surgery    RECOMMENDATION:  APPROVAL GIVEN to proceed with proposed procedure, without further diagnostic evaluation.    Review of the result(s) of each unique test - labs        Subjective     1. No - Have you ever had a heart attack or stroke?  2. No - Have you ever had surgery on your heart or blood vessels, such as a stent, coronary (heart) bypass, or surgery on an artery in the head, neck, heart, or legs?  3. No - Do you have chest pain when you are physically active?  4. No - Do you have a history of heart failure?  5. No - Do you currently have a cold, bronchitis, or symptoms of other respiratory (head and chest) infections?  6. No - Do you have a cough, shortness of breath, or wheezing?  7. No - Do you or  anyone in your family have a history of blood clots?  8. No - Do you or anyone in your family have a serious bleeding problem, such as long-lasting bleeding after surgeries or cuts?  9. No - Have you ever had anemia or been told to take iron pills?  10. No - Have you had any abnormal blood loss such as black, tarry or bloody stools, or abnormal vaginal bleeding?  11. No - Have you ever had a blood transfusion?  12. Yes - Are you willing to have a blood transfusion if it is medically needed before, during, or after your surgery?  13. No - Have you or anyone in your family ever had problems with anesthesia (sedation for surgery)?  14. No - Do you have sleep apnea, excessive snoring, or daytime drowsiness?   15. No - Do you have any artifical heart valves or other implanted medical devices, such as a pacemaker, defibrillator, or continuous glucose monitor?  16. No - Do you have any artifical joints?  17. No - Are you allergic to latex?  18. No - Is there any chance that you may be pregnant?      Health Care Directive:  Patient does not have a Health Care Directive or Living Will: Discussed advance care planning with patient; however, patient declined at this time.    Preoperative Review of :   reviewed - no record of controlled substances prescribed.      Status of Chronic Conditions:  See problem list for active medical problems.  Problems all longstanding and stable, except as noted/documented.  See ROS for pertinent symptoms related to these conditions.      Review of Systems  CONSTITUTIONAL: NEGATIVE for fever, chills, change in weight  ENT/MOUTH: NEGATIVE for ear, mouth and throat problems  RESP: NEGATIVE for significant cough or SOB  CV: NEGATIVE for chest pain, palpitations or peripheral edema    Patient Active Problem List    Diagnosis Date Noted     ACP (advance care planning) 09/13/2016     Priority: Medium                Mixed hyperlipidemia 06/29/2013     Priority: Medium     Abnormal glucose  2013     Priority: Medium     Problem list name updated by automated process. Provider to review       Family history of malignant neoplasm of prostate 2013     Priority: Medium     FAM HX-CARDIOVAS DIS NEC      Priority: Medium     Father had MI at age 44       Displacement of lumbar intervertebral disc without myelopathy      Priority: Medium     Health Care Home 2013     Priority: Low     State Tier Level:  Tier 1  Status:  n/a  Care Coordinator:  See Letters for [facility] Care Plan              Past Medical History:   Diagnosis Date     Displacement of lumbar intervertebral disc without myelopathy      FAMILY HX-CARDIOVAS DIS NEC      FAMILY HX-CARDIOVAS DIS NEC     Father had MI at age 44     LUMBAR DISC DISPLACEMENT      Mixed hyperlipidemia 8/10/2001     Pityriasis versicolor 2004     Past Surgical History:   Procedure Laterality Date     ZZC GUERRA W/O FACETEC FORAMOT/DSKC 1/2 VRT SEG, LUMBAR  97     ZZC GUERRA W/O FACETEC FORAMOT/DSKC /2 VRT SEG, LUMBAR  98     Current Outpatient Medications   Medication Sig Dispense Refill     acetaminophen (TYLENOL) 500 MG tablet Take 500-1,000 mg by mouth every 6 hours as needed for mild pain       cyclobenzaprine (FLEXERIL) 10 MG tablet Take 1 tablet (10 mg) by mouth nightly as needed for muscle spasms 90 tablet 1     gabapentin (NEURONTIN) 300 MG capsule Take 1 capsule (300 mg) by mouth 4 times daily 360 capsule 1       Allergies   Allergen Reactions     Morphine      NAUSEA        Social History     Tobacco Use     Smoking status: Never     Smokeless tobacco: Never   Substance Use Topics     Alcohol use: No     Family History   Problem Relation Age of Onset     Alcohol/Drug Mother      Heart Disease Father          44 MI     Lipids Maternal Grandmother      Prostate Cancer Maternal Grandfather      Cardiovascular Brother         MVR     Cancer No family hx of      Diabetes No family hx of      Cancer - colorectal No family hx of      Cerebrovascular  "Disease No family hx of      History   Drug Use No         Objective     /84 (BP Location: Left arm, Patient Position: Chair, Cuff Size: Adult Large)   Pulse 88   Temp 97.6  F (36.4  C) (Temporal)   Resp 20   Ht 2.057 m (6' 9\")   Wt 121.9 kg (268 lb 12.8 oz)   BMI 28.80 kg/m      Physical Exam  GENERAL APPEARANCE: healthy, alert and no distress  HENT: ear canals and TM's normal and nose and mouth without ulcers or lesions  RESP: lungs clear to auscultation - no rales, rhonchi or wheezes  CV: regular rate and rhythm, normal S1 S2, no S3 or S4 and no murmur, click or rub   ABDOMEN: soft, nontender, no HSM or masses and bowel sounds normal  NEURO: Normal strength and tone, sensory exam grossly normal, mentation intact and speech normal    Recent Labs   Lab Test 10/13/22  0000 12/02/21  1042 12/02/21  0000   HGB  --  15.9  --    PLT  --  250  --    .3  --  143.3   POTASSIUM 4.44  --  4.64   CR 1.14  --  1.07        Diagnostics:  Recent Results (from the past 24 hour(s))   HEMOGRAM PLATELET DIFF (BFP)    Collection Time: 01/23/23 12:00 AM   Result Value Ref Range    WBC 5.9 4.0 - 11 10*9/L    RBC Count 5.13 4.4 - 5.9 10*12/L    Hemoglobin 15.3 13.3 - 17.7 g/dL    Hematocrit 46.4 40.0 - 53.0 %    MCV 90.4 78 - 100 fL    MCH 29.8 26 - 33 pg    MCHC 33.0 31 - 36 g/dL    Platelet Count 195 150 - 375 10^9/L    % Granulocytes 56.5 %    % Lymphocytes 32.5 %    % Monocytes 11.0 %      No EKG required, no history of coronary heart disease, significant arrhythmia, peripheral arterial disease or other structural heart disease.    Revised Cardiac Risk Index (RCRI):  The patient has the following serious cardiovascular risks for perioperative complications:   - No serious cardiac risks = 0 points     RCRI Interpretation: 0 points: Class I (very low risk - 0.4% complication rate)           Signed Electronically by: Isak Granados MD  Copy of this evaluation report is provided to requesting physician.      "

## 2023-01-23 NOTE — LETTER
[facility]  [address] [place] [address]  [address]  [facility] [address]  Phone: [phone]  Fax: [phone]  Primary Provider: Ila Granados  Pre-op Performing Provider: ILA GRANADOS      PREOPERATIVE EVALUATION:  Today's date: 1/23/2023    Jovon Loya is a 52 year old male who presents for a preoperative evaluation.    Surgical Information:  Surgery/Procedure: microdiscectomy  Surgery Location: [place]  Surgeon:  Colt Jackson MD  Surgery Date: 1/27/23  Time of Surgery: am  Where patient plans to recover: At home with family  Fax number for surgical facility: [phone]    Type of Anesthesia Anticipated: to be determined    Assessment & Plan     The proposed surgical procedure is considered INTERMEDIATE risk.    Preoperative examination      Osteoarthritis of spine with radiculopathy, lumbar region      Post laminectomy syndrome      Mixed hyperlipidemia              Risks and Recommendations:  The patient has the following additional risks and recommendations for perioperative complications:   - No identified additional risk factors other than previously addressed    Medication Instructions:  Patient is to take all scheduled medications on the day of surgery    RECOMMENDATION:  APPROVAL GIVEN to proceed with proposed procedure, without further diagnostic evaluation.    Review of the result(s) of each unique test - labs        Subjective     1. No - Have you ever had a heart attack or stroke?  2. No - Have you ever had surgery on your heart or blood vessels, such as a stent, coronary (heart) bypass, or surgery on an artery in the head, neck, heart, or legs?  3. No - Do you have chest pain when you are physically active?  4. No - Do you have a history of heart failure?  5. No - Do you currently have a cold, bronchitis, or symptoms of other respiratory (head and chest) infections?  6. No - Do you have a cough, shortness of breath, or wheezing?  7. No - Do you or  anyone in your family have a history of blood clots?  8. No - Do you or anyone in your family have a serious bleeding problem, such as long-lasting bleeding after surgeries or cuts?  9. No - Have you ever had anemia or been told to take iron pills?  10. No - Have you had any abnormal blood loss such as black, tarry or bloody stools, or abnormal vaginal bleeding?  11. No - Have you ever had a blood transfusion?  12. Yes - Are you willing to have a blood transfusion if it is medically needed before, during, or after your surgery?  13. No - Have you or anyone in your family ever had problems with anesthesia (sedation for surgery)?  14. No - Do you have sleep apnea, excessive snoring, or daytime drowsiness?   15. No - Do you have any artifical heart valves or other implanted medical devices, such as a pacemaker, defibrillator, or continuous glucose monitor?  16. No - Do you have any artifical joints?  17. No - Are you allergic to latex?  18. No - Is there any chance that you may be pregnant?      Health Care Directive:  Patient does not have a Health Care Directive or Living Will: Discussed advance care planning with patient; however, patient declined at this time.    Preoperative Review of :   reviewed - no record of controlled substances prescribed.      Status of Chronic Conditions:  See problem list for active medical problems.  Problems all longstanding and stable, except as noted/documented.  See ROS for pertinent symptoms related to these conditions.      Review of Systems  CONSTITUTIONAL: NEGATIVE for fever, chills, change in weight  ENT/MOUTH: NEGATIVE for ear, mouth and throat problems  RESP: NEGATIVE for significant cough or SOB  CV: NEGATIVE for chest pain, palpitations or peripheral edema    Patient Active Problem List    Diagnosis Date Noted     ACP (advance care planning) 09/13/2016     Priority: Medium                Mixed hyperlipidemia 06/29/2013     Priority: Medium     Abnormal glucose  2013     Priority: Medium     Problem list name updated by automated process. Provider to review       Family history of malignant neoplasm of prostate 2013     Priority: Medium     FAM HX-CARDIOVAS DIS NEC      Priority: Medium     Father had MI at age 44       Displacement of lumbar intervertebral disc without myelopathy      Priority: Medium     Health Care Home 2013     Priority: Low     State Tier Level:  Tier 1  Status:  n/a  Care Coordinator:  See Letters for [facility] Care Plan              Past Medical History:   Diagnosis Date     Displacement of lumbar intervertebral disc without myelopathy      FAMILY HX-CARDIOVAS DIS NEC      FAMILY HX-CARDIOVAS DIS NEC     Father had MI at age 44     LUMBAR DISC DISPLACEMENT      Mixed hyperlipidemia 8/10/2001     Pityriasis versicolor 2004     Past Surgical History:   Procedure Laterality Date     ZZC GUERRA W/O FACETEC FORAMOT/[facility]KC 1/2 VRT SEG, LUMBAR  97     ZZC GUERRA W/O FACETEC FORAMOT/[facility]KC /2 VRT SEG, LUMBAR  98     Current Outpatient Medications   Medication Sig Dispense Refill     acetaminophen (TYLENOL) 500 MG tablet Take 500-1,000 mg by mouth every 6 hours as needed for mild pain       cyclobenzaprine (FLEXERIL) 10 MG tablet Take 1 tablet (10 mg) by mouth nightly as needed for muscle spasms 90 tablet 1     gabapentin (NEURONTIN) 300 MG capsule Take 1 capsule (300 mg) by mouth 4 times daily 360 capsule 1       Allergies   Allergen Reactions     Morphine      NAUSEA        Social History     Tobacco Use     Smoking status: Never     Smokeless tobacco: Never   Substance Use Topics     Alcohol use: No     Family History   Problem Relation Age of Onset     Alcohol/Drug Mother      Heart Disease Father          44 MI     Lipids Maternal Grandmother      Prostate Cancer Maternal Grandfather      Cardiovascular Brother         MVR     Cancer No family hx of      Diabetes No family hx of      Cancer - colorectal No family hx of      Cerebrovascular  "Disease No family hx of      History   Drug Use No         Objective     /84 (BP Location: Left arm, Patient Position: Chair, Cuff Size: Adult Large)   Pulse 88   Temp 97.6  F (36.4  C) (Temporal)   Resp 20   Ht 2.057 m (6' 9\")   Wt 121.9 kg (268 lb 12.8 oz)   BMI 28.80 kg/m      Physical Exam  GENERAL APPEARANCE: healthy, alert and no distress  HENT: ear canals and TM's normal and nose and mouth without ulcers or lesions  RESP: lungs clear to auscultation - no rales, rhonchi or wheezes  CV: regular rate and rhythm, normal S1 S2, no S3 or S4 and no murmur, click or rub   ABDOMEN: soft, nontender, no HSM or masses and bowel sounds normal  NEURO: Normal strength and tone, sensory exam grossly normal, mentation intact and speech normal    Recent Labs   Lab Test 10/13/22  0000 12/02/21  1042 12/02/21  0000   HGB  --  15.9  --    PLT  --  250  --    .3  --  143.3   POTASSIUM 4.44  --  4.64   CR 1.14  --  1.07        Diagnostics:  Recent Results (from the past 24 hour(s))   HEMOGRAM PLATELET DIFF (BFP)    Collection Time: 01/23/23 12:00 AM   Result Value Ref Range    WBC 5.9 4.0 - 11 10*9/L    RBC Count 5.13 4.4 - 5.9 10*12/L    Hemoglobin 15.3 13.3 - 17.7 g/dL    Hematocrit 46.4 40.0 - 53.0 %    MCV 90.4 78 - 100 fL    MCH 29.8 26 - 33 pg    MCHC 33.0 31 - 36 g/dL    Platelet Count 195 150 - 375 10^9/L    % Granulocytes 56.5 %    % Lymphocytes 32.5 %    % Monocytes 11.0 %      No EKG required, no history of coronary heart disease, significant arrhythmia, peripheral arterial disease or other structural heart disease.    Revised Cardiac Risk Index (RCRI):  The patient has the following serious cardiovascular risks for perioperative complications:   - No serious cardiac risks = 0 points     RCRI Interpretation: 0 points: Class I (very low risk - 0.4% complication rate)           Signed Electronically by: Isak Granados MD  Copy of this evaluation report is provided to requesting " physician.

## 2023-01-23 NOTE — NURSING NOTE
Jovon Loya is here for a pre-op exam.    Questioned patient about current smoking habits.  Pt. has never smoked.  PULSE regular  My Chart: active  CLASSIFICATION OF OVERWEIGHT AND OBESITY BY BMI                        Obesity Class           BMI(kg/m2)  Underweight                                    < 18.5  Normal                                         18.5-24.9  Overweight                                     25.0-29.9  OBESITY                     I                  30.0-34.9                             II                 35.0-39.9  EXTREME OBESITY             III                >40                            Patient's  BMI Body mass index is 28.8 kg/m .  http://hin.nhlbi.nih.gov/menuplanner/menu.cgi  Pre-visit planning  Immunizations - up to date  Colonoscopy - is up to date  Mammogram -   Asthma -   PHQ9 -    KARMA-7 -

## 2023-02-07 ENCOUNTER — TRANSFERRED RECORDS (OUTPATIENT)
Dept: FAMILY MEDICINE | Facility: CLINIC | Age: 53
End: 2023-02-07

## 2023-03-06 DIAGNOSIS — M54.16 LUMBAR RADICULOPATHY: ICD-10-CM

## 2023-03-06 DIAGNOSIS — M96.1 POST LAMINECTOMY SYNDROME: ICD-10-CM

## 2023-03-06 RX ORDER — GABAPENTIN 300 MG/1
300 CAPSULE ORAL 4 TIMES DAILY
Qty: 360 CAPSULE | Refills: 1 | Status: SHIPPED | OUTPATIENT
Start: 2023-03-06 | End: 2023-03-08

## 2023-03-06 NOTE — TELEPHONE ENCOUNTER
Pt left a voicemail stating that he has a week's worth left of gabapentin. The prescription is written for 1200 mg per day but he takes 1800 mg. Is he due for an office visit to discuss further refills?    Please advise, thanks.      Pending Prescriptions:                       Disp   Refills    gabapentin (NEURONTIN) 300 MG capsule     360 ca*1            Sig: Take 1 capsule (300 mg) by mouth 4 times daily

## 2023-03-08 RX ORDER — GABAPENTIN 300 MG/1
300 CAPSULE ORAL
Qty: 540 CAPSULE | Refills: 1 | Status: SHIPPED | OUTPATIENT
Start: 2023-03-08 | End: 2023-11-27

## 2023-03-08 NOTE — TELEPHONE ENCOUNTER
Pt does take 1800 (6 per day) right now since mid November.  He states that this was discussed this at his pre-op.  He plans to decrease as he gets further out of surgery and completes more PT.  He would like to know if he can get a new script with those directions. Or how to go about this. Or to stretch out the amount he gets with 4 per day.    Thanks, Flaca      Pending Prescriptions:                       Disp   Refills    gabapentin (NEURONTIN) 300 MG capsule     540 ca*1            Sig: Take 1 capsule (300 mg) by mouth 6 times daily    Signed Prescriptions:                        Disp   Refills    gabapentin (NEURONTIN) 300 MG capsule      360 ca*1        Sig: Take 1 capsule (300 mg) by mouth 4 times daily  Authorizing Provider: ILA TEJEDA

## 2023-05-26 ENCOUNTER — TRANSFERRED RECORDS (OUTPATIENT)
Dept: FAMILY MEDICINE | Facility: CLINIC | Age: 53
End: 2023-05-26

## 2023-05-31 ENCOUNTER — TRANSFERRED RECORDS (OUTPATIENT)
Dept: FAMILY MEDICINE | Facility: CLINIC | Age: 53
End: 2023-05-31

## 2023-06-30 ENCOUNTER — TRANSFERRED RECORDS (OUTPATIENT)
Dept: FAMILY MEDICINE | Facility: CLINIC | Age: 53
End: 2023-06-30

## 2023-08-07 ENCOUNTER — OFFICE VISIT (OUTPATIENT)
Dept: FAMILY MEDICINE | Facility: CLINIC | Age: 53
End: 2023-08-07

## 2023-08-07 VITALS
OXYGEN SATURATION: 97 % | DIASTOLIC BLOOD PRESSURE: 86 MMHG | SYSTOLIC BLOOD PRESSURE: 132 MMHG | HEART RATE: 88 BPM | RESPIRATION RATE: 20 BRPM | TEMPERATURE: 98 F | WEIGHT: 257 LBS | BODY MASS INDEX: 27.54 KG/M2

## 2023-08-07 DIAGNOSIS — K21.9 GASTROESOPHAGEAL REFLUX DISEASE WITHOUT ESOPHAGITIS: ICD-10-CM

## 2023-08-07 DIAGNOSIS — Z86.018 HISTORY OF ATYPICAL SKIN MOLE: Primary | ICD-10-CM

## 2023-08-07 PROCEDURE — 99214 OFFICE O/P EST MOD 30 MIN: CPT | Performed by: STUDENT IN AN ORGANIZED HEALTH CARE EDUCATION/TRAINING PROGRAM

## 2023-08-07 RX ORDER — OMEPRAZOLE 40 MG/1
40 CAPSULE, DELAYED RELEASE ORAL DAILY
Qty: 90 CAPSULE | Refills: 0 | Status: SHIPPED | OUTPATIENT
Start: 2023-08-07 | End: 2023-11-27

## 2023-08-07 NOTE — NURSING NOTE
Chief Complaint   Patient presents with    Gastrophageal Reflux     Burning feeling in this throat and esophagus area, notices that it happens whether he is eating or not, has been going on for the last 3 months now, has not tried taking anything OTC for acid reflux      Pre-visit Screening:  Immunizations:  up to date  Colonoscopy:  is up to date  Mammogram: shar  Asthma Action Test/Plan:  shar  PHQ9:  shar  GAD7:  na  Questioned patient about current smoking habits Pt. has never smoked.  Ok to leave detailed message on voice mail for today's visit only yes, phone # [phone]

## 2023-08-07 NOTE — PATIENT INSTRUCTIONS
Schedule Dermatology visit    Decrease/stop naproxen. Tylenol 1000mg 3x/day better for stomach.     Start omeprazole daily for at least 6-8 weeks.     If symptoms completely resolve, will taper to every other day for 2-3 weeks.    If symptoms don't resolve, will need to discuss further testing (EGD)

## 2023-08-07 NOTE — PROGRESS NOTES
Assessment & Plan       ICD-10-CM    1. History of atypical skin mole  Z86.018 Adult Dermatology Referral      2. Gastroesophageal reflux disease without esophagitis  K21.9 omeprazole (PRILOSEC) 40 MG DR capsule         Favor biopsy but given location will defer to Dermatologist  Discussed GERD lifestyle measures, no red flags.     Patient Instructions   Schedule Dermatology visit    Decrease/stop naproxen. Tylenol 1000mg 3x/day better for stomach.     Start omeprazole daily for at least 6-8 weeks.     If symptoms completely resolve, will taper to every other day for 2-3 weeks.    If symptoms don't resolve, will need to discuss further testing (EGD)          Reasons to follow-up sooner or seek emergent care reviewed.     Charles Cervantes MD, [facility]       Subjective     Jovon Loya is a 53 year old male who presents to clinic today for the following health issues:    HPI   Chief Complaint   Patient presents with     Gastrophageal Reflux     Burning feeling in this throat and esophagus area, notices that it happens whether he is eating or not, has been going on for the last 3 months now, has not tried taking anything OTC for acid reflux      Derm Problem     Hx multiple atypical moles. Overdue for Dermatology follow-up but his prior Dermatologist is no longer practicing. New left facial lesion. Hx of multiple biopsies but never any cancer diagnosis.       GERD/Heartburn  Onset/Duration: few months  Description: burning  Progression of Symptoms: same  Accompanying Signs & Symptoms:  Does it feel like food gets stuck or trouble swallowing: No  Nausea: No  Vomiting (bloody?): No  Abdominal Pain: No  Black-Tarry stools: No  Bloody stools: No (hx of chronic constipation with rare blood when wiping unchanged)  History:  Previous similar episodes: No  Previous ulcers: No  Precipitating factors:   Caffeine use: YES- 1-3 cans of soda daily  Alcohol use: No  NSAID/Aspirin use: YES- daily  naproxen  Tobacco use: No  Worse with knows to avoid spicy food, no particular food or drink.  Alleviating factors: None  Therapies tried and outcome:                   Medications: none    Skin Lesion  Onset/Duration: new mole on left cheek  Description  Location: L cheek  Color: brown  Border description: irregular border  Itching: no  Bleeding:  No  Progression of Symptoms:  n/a  Accompanying signs and symptoms:   Bleeding: No  Scaling: No  Excessive sun exposure/tanning: No  History:           Any previous history of skin cancer: hx of atypical lesions, no cancer  Any family history of melanoma: No  Last seen by Derm ~7 yrs ago        Objective    /86 (BP Location: Left arm, Patient Position: Sitting, Cuff Size: Adult Large)   Pulse 88   Temp 98  F (36.7  C) (Temporal)   Resp 20   Wt 116.6 kg (257 lb)   SpO2 97%   BMI 27.54 kg/m    Body mass index is 27.54 kg/m .  Alert, NAD  NC/AT  Sclerae anicteric  Regular  Resp nonlabored  Skin warm and dry, numerous moles throughout body, slightly raised brown papular lesions L cheek nontender  No focal neuro deficits. Speech intact.   Appropriate affect  Normal gait.      Labs reviewed.

## 2023-09-12 ENCOUNTER — TRANSFERRED RECORDS (OUTPATIENT)
Dept: FAMILY MEDICINE | Facility: CLINIC | Age: 53
End: 2023-09-12

## 2023-11-08 DIAGNOSIS — K21.9 GASTROESOPHAGEAL REFLUX DISEASE WITHOUT ESOPHAGITIS: ICD-10-CM

## 2023-11-20 ENCOUNTER — MYC MEDICAL ADVICE (OUTPATIENT)
Dept: FAMILY MEDICINE | Facility: CLINIC | Age: 53
End: 2023-11-20

## 2023-11-20 RX ORDER — OMEPRAZOLE 40 MG/1
40 CAPSULE, DELAYED RELEASE ORAL DAILY
COMMUNITY
Start: 2023-11-20

## 2023-11-20 NOTE — TELEPHONE ENCOUNTER
Received incoming refill request for  Pending Prescriptions:                       Disp   Refills    omeprazole (PRILOSEC) 40 MG DR capsule [P*90 cap*0            Sig: TAKE 1 CAPSULE(40 MG) BY MOUTH DAILY    Sent patient mychart asking how medication is working and if he wants to continue on this. Will wait to hear back.

## 2023-11-27 ENCOUNTER — OFFICE VISIT (OUTPATIENT)
Dept: FAMILY MEDICINE | Facility: CLINIC | Age: 53
End: 2023-11-27

## 2023-11-27 VITALS
HEIGHT: 78 IN | BODY MASS INDEX: 29.27 KG/M2 | RESPIRATION RATE: 20 BRPM | WEIGHT: 253 LBS | SYSTOLIC BLOOD PRESSURE: 122 MMHG | HEART RATE: 80 BPM | TEMPERATURE: 97.1 F | DIASTOLIC BLOOD PRESSURE: 84 MMHG

## 2023-11-27 DIAGNOSIS — K21.9 GASTROESOPHAGEAL REFLUX DISEASE WITHOUT ESOPHAGITIS: ICD-10-CM

## 2023-11-27 DIAGNOSIS — E78.2 MIXED HYPERLIPIDEMIA: ICD-10-CM

## 2023-11-27 DIAGNOSIS — M79.18 LEFT BUTTOCK PAIN: ICD-10-CM

## 2023-11-27 DIAGNOSIS — M54.16 LUMBAR RADICULOPATHY: ICD-10-CM

## 2023-11-27 DIAGNOSIS — M47.26 OSTEOARTHRITIS OF SPINE WITH RADICULOPATHY, LUMBAR REGION: ICD-10-CM

## 2023-11-27 DIAGNOSIS — M96.1 POST LAMINECTOMY SYNDROME: Primary | ICD-10-CM

## 2023-11-27 LAB
ALBUMIN SERPL-MCNC: 4.6 G/DL (ref 3.6–5.1)
ALBUMIN/GLOB SERPL: 2.2 {RATIO} (ref 1–2.5)
ALP SERPL-CCNC: 45 U/L (ref 33–130)
ALT 1742-6: 23 U/L (ref 0–32)
AST 1920-8: 12 U/L (ref 0–35)
BILIRUB SERPL-MCNC: 0.7 MG/DL (ref 0.2–1.2)
BUN SERPL-MCNC: 13 MG/DL (ref 7–25)
BUN/CREATININE RATIO: 11.1 (ref 6–32)
CALCIUM SERPL-MCNC: 9.5 MG/DL (ref 8.6–10.3)
CHLORIDE SERPLBLD-SCNC: 105.5 MMOL/L (ref 98–110)
CO2 SERPL-SCNC: 26.7 MMOL/L (ref 20–32)
CREAT SERPL-MCNC: 1.17 MG/DL (ref 0.6–1.3)
GLOBULIN, CALCULATED - QUEST: 2.1 (ref 1.9–3.7)
GLUCOSE SERPL-MCNC: 105 MG/DL (ref 60–99)
POTASSIUM SERPL-SCNC: 4.49 MMOL/L (ref 3.5–5.3)
PROT SERPL-MCNC: 6.7 G/DL (ref 6.1–8.1)
SODIUM SERPL-SCNC: 140.9 MMOL/L (ref 135–146)

## 2023-11-27 PROCEDURE — 99214 OFFICE O/P EST MOD 30 MIN: CPT | Performed by: FAMILY MEDICINE

## 2023-11-27 PROCEDURE — 36415 COLL VENOUS BLD VENIPUNCTURE: CPT | Performed by: FAMILY MEDICINE

## 2023-11-27 PROCEDURE — 80053 COMPREHEN METABOLIC PANEL: CPT | Performed by: FAMILY MEDICINE

## 2023-11-27 RX ORDER — CYCLOBENZAPRINE HCL 10 MG
10 TABLET ORAL
Qty: 90 TABLET | Refills: 1 | Status: SHIPPED | OUTPATIENT
Start: 2023-11-27

## 2023-11-27 RX ORDER — GABAPENTIN 300 MG/1
300 CAPSULE ORAL
Qty: 540 CAPSULE | Refills: 1 | Status: SHIPPED | OUTPATIENT
Start: 2023-11-27

## 2023-11-27 RX ORDER — VIT C/B6/B5/MAGNESIUM/HERB 173 50-5-6-5MG
1 CAPSULE ORAL DAILY
COMMUNITY
Start: 2023-11-27

## 2023-11-27 RX ORDER — OMEPRAZOLE 40 MG/1
40 CAPSULE, DELAYED RELEASE ORAL DAILY
Qty: 90 CAPSULE | Refills: 1 | Status: CANCELLED | OUTPATIENT
Start: 2023-11-27

## 2023-11-27 NOTE — PROGRESS NOTES
"  Assessment & Plan     Post laminectomy syndrome  Doing better with weight loss, meds, continue current medications at current doses   - gabapentin (NEURONTIN) 300 MG capsule  Dispense: 540 capsule; Refill: 1  - cyclobenzaprine (FLEXERIL) 10 MG tablet  Dispense: 90 tablet; Refill: 1  - Comprehensive Metobolic Panel (BFP)  - VENOUS COLLECTION    Osteoarthritis of spine with radiculopathy, lumbar region    - Comprehensive Metobolic Panel (BFP)  - VENOUS COLLECTION    Lumbar radiculopathy    - gabapentin (NEURONTIN) 300 MG capsule  Dispense: 540 capsule; Refill: 1  - cyclobenzaprine (FLEXERIL) 10 MG tablet  Dispense: 90 tablet; Refill: 1    Left buttock pain    - cyclobenzaprine (FLEXERIL) 10 MG tablet  Dispense: 90 tablet; Refill: 1    Mixed hyperlipidemia  Has been eating better, last Cv risk in range    Gastroesophageal reflux disease without esophagitis  Well controlled, now using turmeric      Review of the result(s) of each unique test - labs  Ordering of each unique test  Prescription drug management         BMI:   Estimated body mass index is 27.11 kg/m  as calculated from the following:    Height as of this encounter: 2.057 m (6' 9\").    Weight as of this encounter: 114.8 kg (253 lb).   Weight management plan: Discussed healthy diet and exercise guidelines    FUTURE APPOINTMENTS:       - Follow-up visit in 1 yr  Work on weight loss  Regular exercise    No follow-ups on file.    Isak Granados MD  [facility]    Suzanna Linda is a 53 year old, presenting for the following health issues:  Medication Request    HPI       Hyperlipidemia Follow-Up    Are you regularly taking any medication or supplement to lower your cholesterol?   No  Are you having muscle aches or other side effects that you think could be caused by your cholesterol lowering medication?  No    Back pain- doing a bit better after recent injection, doing PT, still has trouble if sits over 30 minutes, using " "gabapentin    GERD- PPI helped but symptoms recurred after stopping, now on turmeric, also improved with better diet  How many servings of fruits and vegetables do you eat daily?  2-3  On average, how many sweetened beverages do you drink each day (Examples: soda, juice, sweet tea, etc.  Do NOT count diet or artificially sweetened beverages)?   1  How many days per week do you exercise enough to make your heart beat faster? 3 or less  How many minutes a day do you exercise enough to make your heart beat faster? 30 - 60  How many days per week do you miss taking your medication? 0        Review of Systems   Constitutional, HEENT, cardiovascular, pulmonary, gi and gu systems are negative, except as otherwise noted.      Objective    /84 (BP Location: Left arm, Patient Position: Chair, Cuff Size: Adult Regular)   Pulse 80   Temp 97.1  F (36.2  C) (Temporal)   Resp 20   Ht 2.057 m (6' 9\")   Wt 114.8 kg (253 lb)   BMI 27.11 kg/m    Body mass index is 27.11 kg/m .  Physical Exam   GENERAL: healthy, alert and no distress  NECK: no adenopathy, no asymmetry, masses, or scars and thyroid normal to palpation  RESP: lungs clear to auscultation - no rales, rhonchi or wheezes  CV: regular rate and rhythm, normal S1 S2, no S3 or S4, no murmur, click or rub, no peripheral edema and peripheral pulses strong  ABDOMEN: soft, nontender, no hepatosplenomegaly, no masses and bowel sounds normal  MS: no gross musculoskeletal defects noted, no edema  PSYCH: mentation appears normal, affect normal/bright    Office Visit on 01/23/2023   Component Date Value Ref Range Status    WBC 01/23/2023 5.9  4.0 - 11 10*9/L Final    RBC Count 01/23/2023 5.13  4.4 - 5.9 10*12/L Final    Hemoglobin 01/23/2023 15.3  13.3 - 17.7 g/dL Final    Hematocrit 01/23/2023 46.4  40.0 - 53.0 % Final    MCV 01/23/2023 90.4  78 - 100 fL Final    MCH 01/23/2023 29.8  26 - 33 pg Final    MCHC 01/23/2023 33.0  31 - 36 g/dL Final    Platelet Count 01/23/2023 " 195  150 - 375 10^9/L Final    % Granulocytes 01/23/2023 56.5  % Final    % Lymphocytes 01/23/2023 32.5  % Final    % Monocytes 01/23/2023 11.0  % Final

## 2023-11-27 NOTE — NURSING NOTE
Jovon Loya is here for a medication check and refill.  Questioned patient about current smoking habits.  Pt. has never smoked.  PULSE regular  My Chart: active  CLASSIFICATION OF OVERWEIGHT AND OBESITY BY BMI                        Obesity Class           BMI(kg/m2)  Underweight                                    < 18.5  Normal                                         18.5-24.9  Overweight                                     25.0-29.9  OBESITY                     I                  30.0-34.9                             II                 35.0-39.9  EXTREME OBESITY             III                >40                            Patient's  BMI Body mass index is 27.11 kg/m .  http://hin.nhlbi.nih.gov/menuplanner/menu.cgi  Pre-visit planning  Immunizations - up to date  Colonoscopy - UTD  Mammogram -   Asthma -   PHQ9 -    KARMA-7 -      The patient has verbalized that it is ok to leave a detailed voice message on the patient's cell phone with results/recommendations from this visit.

## 2024-02-02 NOTE — TELEPHONE ENCOUNTER
Dr. Granados     Patient left a message ( 11/13/2020 @ 3:55pm )  asking for an order & referral to [facility] / [facility]aw[facility] for PT for his back.  Patient stated that he was advised the next  step is a stimulator (did not say what specialist he is seeing for his back suggested the stimulator).     Patient would like to try PT with [facility] first    Will patient need to be seen for the order/referral?     Please advise     Thank you     Patient call back # [phone]   Occupational Therapy Discharge    Visit Type: Discharge Summary  Visit: 5  Referring Provider: Tim Goode MD  Medical Diagnosis (from order): M25.531, M25.532 - Pain in both wrists  R20.0 - Finger numbness   Patient alert and oriented X3.    SUBJECTIVE                                                                                                               Patient states she had moderate onset of soreness about the left hand/forearm after our last session. However, her pain has subsided. She also has noticed her paresthesias are now very minimal and intermittent in nature.     Pain / Symptoms  - Pain rating (out of 10): Current: 2   - Location: Left first dorsal compartment, dorsum of left wrist  - Quality / Description: tight, ache, stiff  - Alleviating Factors: heat, massage, ice, rest     OBJECTIVE                                                                                                                     Range of Motion (ROM)   (degrees unless noted; active unless noted; norms in ( ); negative=lacking to 0, positive=beyond 0)  Elbow/Forearm:   - Supination (80):       Left: 75       Right: 85  Wrist:   - Flexion (60-80):       Left: 80        Right: 85    - Extension (60-70):       Left: 80       Right: 80    - Radial Deviation (20):       Left: 20       Right: 20    - Ulnar Deviation (30):       Left: 30       Right: 30    Strength  (out of 5 unless noted, standard test position unless noted)   : (lbs)    - Neutral:         Left: Trial(s): 47         Right: Trial(s): 50     norms: 55-59 years, male: left 59.8-106.6, right 74.4-127.8; female: left 35.4-59.2, right 44.8-69.8         Palpation  Left  - Extensor Digitorum: hypertonic  - Extensor Pollicis Longus: hypertonic  Right  - Extensor Digitorum: hypertonic     Special Tests  Wrist/Hand: Tendon  - Finkelstein's:  Left: positive Right: negative  Wrist/Hand: Resisted  - Wrist Extensor Resist:  - Left: negative - Right: negative  - Wrist  Flexor Resist: - Left: negative - Right: negative  - Resisted Middle Finger Extension:  - Left: positive - Right: negative          Range of Motion (ROM) (hand specific)  (degrees unless noted; active unless noted; norms in ( ); negative=lacking to 0, positive=beyond 0)  Thumb:    - MCP Flexion (40):         Left: 55         Right:  55    - IPJt Flexion (80):         Left: 75         Right: 75         Treatment    Moist Heat (37619)  - Location: Bilateral hands/wrist  - Position: sitting  - Duration: 5 minutes    Results: tissue softening  Reaction: no adverse reaction to treatment      Therapeutic Exercise  Completed re-assessment:  Range of motion measurements   strength measurements  Reviewed Qnary home exercise program    Passive/active-assisted/active range of motion:   Digits 2-5:  Metacarpophalangeal joint flexion and extension, proximal interphalangeal joint flexion and extension, distal interphalangeal joint flexion and extension (isolated and combined)    Thumb: Metacarpophalangeal joint flexion and extension, interphalangeal joint flexion and extension, radial abduction, palmar abduction  Thumb Interphalangeal joint blocking  Thumb opposition to digit 5  Wrist: flexion, extension, radial and ulnar deviation  Wrist isometrics: flexion and extension       Manual Therapy   Soft tissue mobilization   Instrument-assisted soft tissue mobilization right wrist extrinsics,   Wrist volar joint mobilization glides (grades III-IV) 2x40 sec holds each (to improve wrist flexion/extension)        Therapeutic Activity  [place] theraputty: mild resistance  Composite grasping x10  Pinch and pull x 1 full bucket x2 rounds      Neuromuscular Re-Education  Promoting improving coordination, kinesthetic sense, dexterity, and proprioception    Following joint mobilizations: neuromuscular facilitation:  Passive, active-assisted, active, isometrics: wrist flexion/extension    Bilateral:  Hook fist to full fist x20  PIP and  DIP joint blocking x20 (digits 2-5)  Digit spreading x10  Digit intrinsic stretches 2x20 sec holds each (digits 2-5)   MCP joint extension place and holds 2x10 sec holds each (digits 2-5)  Static wrist flexion/extension stretches 2x30 sec holds each     Wristocizer- wrist flexion/extension x30 each  Blue  powerweb: composite grasping x30   Blue powerweb: thumb IP joint flexion x30  Black  powerweb: static wrist flexion stretch 2x30 sec holds each  Yellow flexbar: combined grasp with active wrist flexion/extension x20 each  Yellow flexbar: combined grasp with active supination and pronation x20 each  Red digiflex: hook fist and lateral pinch x20 each     New: (deferred)   Wrist extension: 2 lbs x20  Wrist radial and ulnar deviation: 3 lbs x20   Wrist flexion: 3 lbs x20       Skilled input: verbal instruction/cues, tactile instruction/cues, posture correction, demonstration, inhibition, as detailed above and facilitation    Writer verbally educated and received verbal consent for hand placement, positioning of patient, and techniques to be performed today from patient for hand placement and palpation for techniques, therapist position for techniques, modality application and clothing adjustments for techniques as described above and how they are pertinent to the patient's plan of care.  Home Exercise Program  Access Code: [email or web address]  URL: [email or web address]  Date: 01/03/2024  Prepared by:     Exercises  - Standing Wrist Flexion Stretch  - 1 x daily - 7 x weekly - 3 sets - 3 reps - 30 hold  - Wrist Flexor Stretch in Pronation  - 1 x daily - 7 x weekly - 3 sets - 3 reps - 30 hold  - Finkelstein Stretch  - 1 x daily - 7 x weekly - 3 sets - 3 reps - 30 hold  - Seated Wrist Flexor Hook Fist Tendon Gliding  - 1 x daily - 7 x weekly - 3 sets - 10 reps  - Seated Single Digit Intrinsic Stretch  - 1 x daily - 7 x weekly - 3 sets - 3 reps - 30 hold  - Thumb Opposition with Slide  - 1 x daily - 7 x weekly  - 3 sets - 10 reps      ASSESSMENT                                                                                                          To date the patient has made gains as expected.    Patient has completed 5 Occupational Therapy follow-up visits for bilateral wrist pain.    Overall, patient has made significant improvements with reduction of pain with bilateral hands and wrists. Mild to moderate wrist extrinsic extensor tightness is noted affecting wrist and digital mobility with mild pain about the MCP joints of left hand digits 2-3. Wrist mobility is full and intact. Paresthesias have resolved. She has now resumed all daily functional household and work-related tasks without much difficulty. Patient will be discharge  d from skilled Occupational Therapy services at this time and will transition to her home exercise program.      Education:   - Present and ready to learn: patient  - Results of above outlined education: Verbalizes understanding and Demonstrates understanding    PLAN                                                                                                                           Discharge from skilled therapy with instructions/recommendations to: follow up with referring provider, continue home exercise program    Suggestions for next session as indicated: Discharge from occupational therapy services. Transition to home exercise program.     Goals  Long Term Goals: to be met by end of plan of care   1. Patient will  and lift a light grocery bag,  steering wheel, perform light home maintenance tasks with reported manageable/tolerable difficulty within 4-6 weeks. Status: met   2. Patient will be able to demonstrate improved wrist flexion and extension to 75 degrees to facilitate improved ability to perform household-related tasks without difficulty within 4-6 weeks.   Status: met   3. Patient will increase left hand  strength to 35 pounds to facilitate improved ability to  perform household-related tasks to include lifting groceries without difficulty within 4-6 weeks.    Status: met   4. Patient will complete functional writing and cap/lid removal with reported manageable/tolerable difficulty within 4 weeks.Status: met   5.  Patient will be independent with progressed and modified home exercise program.Status: met   6.  Quick DASH: Patient will complete form to reflect an improved calculated score to less than or equal to 20 to indicate patient reported improvement in function/disability/impairment. (minimal clinically important difference = 15.91)Status: met       Therapy procedure time and total treatment time can be found documented on the Time Entry flowsheet

## 2024-10-29 ENCOUNTER — TRANSFERRED RECORDS (OUTPATIENT)
Dept: FAMILY MEDICINE | Facility: CLINIC | Age: 54
End: 2024-10-29

## 2025-04-22 ENCOUNTER — OFFICE VISIT (OUTPATIENT)
Dept: FAMILY MEDICINE | Facility: CLINIC | Age: 55
End: 2025-04-22

## 2025-04-22 VITALS
SYSTOLIC BLOOD PRESSURE: 128 MMHG | DIASTOLIC BLOOD PRESSURE: 74 MMHG | HEIGHT: 78 IN | WEIGHT: 256 LBS | BODY MASS INDEX: 29.62 KG/M2 | RESPIRATION RATE: 20 BRPM | HEART RATE: 96 BPM | TEMPERATURE: 97.7 F

## 2025-04-22 DIAGNOSIS — E78.2 MIXED HYPERLIPIDEMIA: ICD-10-CM

## 2025-04-22 DIAGNOSIS — Z00.00 ROUTINE GENERAL MEDICAL EXAMINATION AT A HEALTH CARE FACILITY: Primary | ICD-10-CM

## 2025-04-22 DIAGNOSIS — Z12.11 SPECIAL SCREENING FOR MALIGNANT NEOPLASMS, COLON: ICD-10-CM

## 2025-04-22 DIAGNOSIS — M51.362 DEGENERATION OF INTERVERTEBRAL DISC OF LUMBAR REGION WITH DISCOGENIC BACK PAIN AND LOWER EXTREMITY PAIN: ICD-10-CM

## 2025-04-22 DIAGNOSIS — Z12.5 SPECIAL SCREENING FOR MALIGNANT NEOPLASM OF PROSTATE: ICD-10-CM

## 2025-04-22 DIAGNOSIS — M96.1 POST LAMINECTOMY SYNDROME: ICD-10-CM

## 2025-04-22 DIAGNOSIS — N52.9 ERECTILE DYSFUNCTION, UNSPECIFIED ERECTILE DYSFUNCTION TYPE: ICD-10-CM

## 2025-04-22 LAB
ALBUMIN SERPL-MCNC: 4.5 G/DL (ref 3.6–5.1)
ALP SERPL-CCNC: 44 U/L (ref 33–130)
ALT 1742-6: 25 U/L (ref 0–32)
AST 1920-8: 21 U/L (ref 0–35)
BILIRUB SERPL-MCNC: 0.8 MG/DL (ref 0.2–1.2)
BUN SERPL-MCNC: 17 MG/DL (ref 7–25)
BUN/CREATININE RATIO: 14 (ref 6–32)
CALCIUM SERPL-MCNC: 9.3 MG/DL (ref 8.6–10.3)
CHLORIDE SERPLBLD-SCNC: 109.5 MMOL/L (ref 98–110)
CHOLEST SERPL-MCNC: 222 MG/DL (ref 0–199)
CHOLEST/HDLC SERPL: 4 {RATIO} (ref 0–5)
CO2 SERPL-SCNC: 20 MMOL/L (ref 20–32)
CREAT SERPL-MCNC: 1.25 MG/DL (ref 0.6–1.3)
GLUCOSE SERPL-MCNC: 121 MG/DL (ref 60–99)
HDLC SERPL-MCNC: 52 MG/DL (ref 40–150)
LDLC SERPL CALC-MCNC: 148 MG/DL (ref 0–129)
POTASSIUM SERPL-SCNC: 4.16 MMOL/L (ref 3.5–5.3)
PROT SERPL-MCNC: 6.7 G/DL (ref 6.1–8.1)
SODIUM SERPL-SCNC: 137.2 MMOL/L (ref 135–146)
TRIGL SERPL-MCNC: 108 MG/DL (ref 0–149)

## 2025-04-22 PROCEDURE — 84153 ASSAY OF PSA TOTAL: CPT | Mod: 90 | Performed by: FAMILY MEDICINE

## 2025-04-22 PROCEDURE — 36415 COLL VENOUS BLD VENIPUNCTURE: CPT | Performed by: FAMILY MEDICINE

## 2025-04-22 PROCEDURE — 99396 PREV VISIT EST AGE 40-64: CPT | Performed by: FAMILY MEDICINE

## 2025-04-22 PROCEDURE — 80053 COMPREHEN METABOLIC PANEL: CPT | Performed by: FAMILY MEDICINE

## 2025-04-22 PROCEDURE — 80061 LIPID PANEL: CPT | Performed by: FAMILY MEDICINE

## 2025-04-22 RX ORDER — CYCLOBENZAPRINE HCL 10 MG
10 TABLET ORAL
Qty: 90 TABLET | Refills: 1 | Status: SHIPPED | OUTPATIENT
Start: 2025-04-22

## 2025-04-22 RX ORDER — GABAPENTIN 300 MG/1
300 CAPSULE ORAL 4 TIMES DAILY
Qty: 360 CAPSULE | Refills: 1 | Status: SHIPPED | OUTPATIENT
Start: 2025-04-22

## 2025-04-22 RX ORDER — CLINDAMYCIN PHOSPHATE 10 MG/G
GEL TOPICAL 2 TIMES DAILY
COMMUNITY
Start: 2025-04-22

## 2025-04-22 RX ORDER — SILDENAFIL 100 MG/1
100 TABLET, FILM COATED ORAL DAILY PRN
Qty: 30 TABLET | Refills: 1 | Status: SHIPPED | OUTPATIENT
Start: 2025-04-22

## 2025-04-22 NOTE — PROGRESS NOTES
3  SUBJECTIVE:   CC: Jovon Loya is an 55 year old male who presents for preventive health visit.       Patient has been advised of split billing requirements and indicates understanding: Yes  Healthy Habits:  Do you get at least three servings of calcium containing foods daily (dairy, green leafy vegetables, etc.)? yes  Amount of exercise or daily activities, outside of work: most day(s) per week  Problems taking medications regularly No  Medication side effects: No  Have you had an eye exam in the past two years? no  Do you see a dentist twice per year? yes  Do you have sleep apnea, excessive snoring or daytime drowsiness?no          Today's PHQ-2 Score:       4/22/2025     8:07 AM 1/12/2023     8:37 AM   PHQ-2 ( 1999 [employer])   Q1: Little interest or pleasure in doing things 0 0   Q2: Feeling down, depressed or hopeless 0 0   PHQ-2 Score 0 0       Abuse: Current or Past(Physical, Sexual or Emotional)- No  Do you feel safe in your environment? Yes        Social History     Tobacco Use    Smoking status: Never     Passive exposure: Never    Smokeless tobacco: Never   Substance Use Topics    Alcohol use: No     If you drink alcohol do you typically have >3 drinks per day or >7 drinks per week? No                      Last PSA:   [facility] PSA   Date Value Ref Range Status   10/13/2022 1.13 < OR = 4.00 ng/mL Final     Comment:     The total PSA value from this assay system is   standardized against the WHO standard. The test   result will be approximately 20% lower when compared   to the equimolar-standardized total PSA (Annemarie   Jesus). Comparison of serial PSA results should be   interpreted with this fact in mind.     This test was performed using the Siemens   chemiluminescent method. Values obtained from   different assay methods cannot be used  interchangeably. PSA levels, regardless of  value, should not be interpreted as absolute  evidence of the presence or absence of disease.         Reviewed orders  with patient. Reviewed health maintenance and updated orders accordingly - Yes  BP Readings from Last 3 Encounters:   25 128/74   23 122/84   23 132/86    Wt Readings from Last 3 Encounters:   25 116.1 kg (256 lb)   23 114.8 kg (253 lb)   23 116.6 kg (257 lb)                  Patient Active Problem List   Diagnosis    Displacement of lumbar intervertebral disc without myelopathy    FAM HX-CARDIOVAS DIS NEC    Family history of malignant neoplasm of prostate    Mixed hyperlipidemia    Abnormal glucose    ACP (advance care planning)     Past Surgical History:   Procedure Laterality Date    ZZC GUERRA W/O FACETEC FORAMOT/[facility] 1/2 VRT SEG, LUMBAR  97    ZZC GUERRA W/O FACETEC FORAMOT/[facility]  VRT SEG, LUMBAR  98       Social History     Tobacco Use    Smoking status: Never     Passive exposure: Never    Smokeless tobacco: Never   Substance Use Topics    Alcohol use: No     Family History   Problem Relation Age of Onset    Alcohol/Drug Mother     Heart Disease Father          44 MI    Lipids Maternal Grandmother     Prostate Cancer Maternal Grandfather     Cardiovascular Brother         MVR    Cancer No family hx of     Diabetes No family hx of     Cancer - colorectal No family hx of     Cerebrovascular Disease No family hx of          Current Outpatient Medications   Medication Sig Dispense Refill    clindamycin (CLEOCIN-T) 1 % external gel Apply topically 2 times daily. Dermatology      cyclobenzaprine (FLEXERIL) 10 MG tablet Take 1 tablet (10 mg) by mouth nightly as needed for muscle spasms 90 tablet 1    gabapentin (NEURONTIN) 300 MG capsule Take 1 capsule (300 mg) by mouth 6 times daily 540 capsule 1    Turmeric 500 MG CAPS Take 1 capsule (500 mg) by mouth daily       Allergies   Allergen Reactions    Morphine      NAUSEA     Recent Labs   Lab Test 23  1513 10/13/22  0000 21  0000 19  0000   LDL  --  148*  --  130   HDL  --  50  --  53   TRIG  --  143  --  127   CR  1.17 1.14   < > 1.21*   POTASSIUM 4.49 4.44   < > 4.57    < > = values in this interval not displayed.        Reviewed and updated as needed this visit by clinical staff   Tobacco  Allergies  Meds              Reviewed and updated as needed this visit by Provider                  Past Medical History:   Diagnosis Date    Displacement of lumbar intervertebral disc without myelopathy     FAMILY HX-CARDIOVAS DIS NEC     FAMILY HX-CARDIOVAS DIS NEC     Father had MI at age 44    LUMBAR DISC DISPLACEMENT     Mixed hyperlipidemia 8/10/2001    Pityriasis versicolor 5/28/2004      Past Surgical History:   Procedure Laterality Date    ZZC GUERRA W/O FACETEC FORAMOT/[facility] 1/2 VRT SEG, LUMBAR  97    ZZC GUERRA W/O FACETEC FORAMOT/[facility] 1/2 VRT SEG, LUMBAR  98       ROS:  CONSTITUTIONAL: NEGATIVE for fever, chills, change in weight  INTEGUMENTARY/SKIN: NEGATIVE for worrisome rashes, moles or lesions  EYES: NEGATIVE for vision changes or irritation  ENT: NEGATIVE for ear, mouth and throat problems  RESP: NEGATIVE for significant cough or SOB  CV: NEGATIVE for chest pain, palpitations or peripheral edema  GI: NEGATIVE for nausea, abdominal pain, heartburn, or change in bowel habits   male: negative for dysuria, hematuria, decreased urinary stream, erectile dysfunction, urethral discharge  MUSCULOSKELETAL: NEGATIVE for significant arthralgias or myalgia  NEURO: NEGATIVE for weakness, dizziness or paresthesias  PSYCHIATRIC: NEGATIVE for changes in mood or affect    OBJECTIVE:   /74 (BP Location: Left arm, Patient Position: Chair, Cuff Size: Adult Large)   Pulse 96   Temp 97.7  F (36.5  C) (Temporal)   Wt 116.1 kg (256 lb)   BMI 27.43 kg/m    EXAM:  GENERAL: alert and no distress  EYES: Eyes grossly normal to inspection, PERRL and conjunctivae and sclerae normal  HENT: ear canals and TM's normal, nose and mouth without ulcers or lesions  NECK: no adenopathy, no asymmetry, masses, or scars  RESP: lungs clear to auscultation -  no rales, rhonchi or wheezes  CV: regular rate and rhythm, normal S1 S2, no S3 or S4, no murmur, click or rub, no peripheral edema  ABDOMEN: soft, nontender, no hepatosplenomegaly, no masses and bowel sounds normal   (male): normal male genitalia without lesions or urethral discharge, no hernia  MS: no gross musculoskeletal defects noted, no edema  SKIN: no suspicious lesions or rashes  NEURO: Normal strength and tone, mentation intact and speech normal  PSYCH: mentation appears normal, affect normal/bright    Diagnostic Test Results:  Labs reviewed in Epic    ASSESSMENT/PLAN:   (Z00.00) Routine general medical examination at a health care facility  (primary encounter diagnosis)  Comment: discussed preventitive healthcare   Plan: Continue to work on healthy diet and exercise, discussed healthy habits     (E78.2) Mixed hyperlipidemia  Comment: control uncertain  Plan: Continue to work on healthy diet and exercise, discussed healthy habits pending labs    (M51.362) Degeneration of intervertebral disc of lumbar region with discogenic back pain and lower extremity pain  Comment: control is fairly good  Plan: continue current medications at current doses     (Z12.5) Special screening for malignant neoplasm of prostate  Comment:   Plan:     (Z12.11) Special screening for malignant neoplasms, colon  Comment: had colonoscopy  Plan:     (M96.1) Post laminectomy syndrome  Comment: stable  Plan:     (N52.9) Erectile dysfunction, unspecified erectile dysfunction type  Comment: Discussed the multifactorial nature of erectile difficulties. No evidence of clear etiology. Discussed treatment options and risks/side effects . He elects to try Viagra. He agrees to never take nitrates and is aware of possible priapism .   Plan: trial sildenafil through [facility]    Patient has been advised of split billing requirements and indicates understanding: Yes  COUNSELING:  Reviewed preventive health counseling, as reflected in patient  "instructions       Regular exercise       Healthy diet/nutrition       Vision screening       Colorectal cancer screening       Prostate cancer screening    Estimated body mass index is 27.43 kg/m  as calculated from the following:    Height as of 11/27/23: 2.057 m (6' 9\").    Weight as of this encounter: 116.1 kg (256 lb).    Weight management plan: Discussed healthy diet and exercise guidelines    He reports that he has never smoked. He has never been exposed to tobacco smoke. He has never used smokeless tobacco.      Counseling Resources:  ATP IV Guidelines  Pooled Cohorts Equation Calculator  FRAX Risk Assessment  ICSI Preventive Guidelines  Dietary Guidelines for Americans, 2010  USDA's MyPlate  ASA Prophylaxis  Lung CA Screening    Isak Granados MD  [facility]  "

## 2025-04-22 NOTE — NURSING NOTE
Jovon Loya is here for a CPX.    Pre-visit planning  Immunizations -up to date  Colonoscopy -is up to date  Mammogram -  Asthma test --  PHQ9 -  KARMA 7 -      Questioned patient about current smoking habits.  Pt. has never smoked.  Body mass index is 27.43 kg/m .  PULSE regular  My Chart: active  CLASSIFICATION OF OVERWEIGHT AND OBESITY BY BMI                        Obesity Class           BMI(kg/m2)  Underweight                                    < 18.5  Normal                                         18.5-24.9  Overweight                                     25.0-29.9  OBESITY                     I                  30.0-34.9                             II                 35.0-39.9  EXTREME OBESITY             III                >40                            Patient's  BMI Body mass index is 27.43 kg/m .      The patient has verbalized that it is ok to leave a detailed voice message on the patient's cell phone with results/recommendations from this visit.

## 2025-04-23 LAB — ABBOTT PSA - QUEST: 1.34 NG/ML

## 2025-05-21 ENCOUNTER — RESULTS FOLLOW-UP (OUTPATIENT)
Dept: GASTROENTEROLOGY | Facility: CLINIC | Age: 55
End: 2025-05-21

## 2025-05-21 PROBLEM — D12.6 ADENOMATOUS COLON POLYP: Status: ACTIVE | Noted: 2025-05-21

## 2025-05-22 ENCOUNTER — PATIENT OUTREACH (OUTPATIENT)
Dept: GASTROENTEROLOGY | Facility: CLINIC | Age: 55
End: 2025-05-22
Payer: COMMERCIAL